# Patient Record
Sex: MALE
[De-identification: names, ages, dates, MRNs, and addresses within clinical notes are randomized per-mention and may not be internally consistent; named-entity substitution may affect disease eponyms.]

---

## 2017-02-15 ENCOUNTER — APPOINTMENT (OUTPATIENT)
Dept: HEART AND VASCULAR | Facility: CLINIC | Age: 47
End: 2017-02-15

## 2017-02-15 VITALS
TEMPERATURE: 97.4 F | BODY MASS INDEX: 26.83 KG/M2 | HEART RATE: 74 BPM | DIASTOLIC BLOOD PRESSURE: 60 MMHG | HEIGHT: 68 IN | WEIGHT: 177 LBS | OXYGEN SATURATION: 97 % | SYSTOLIC BLOOD PRESSURE: 110 MMHG

## 2017-02-15 DIAGNOSIS — Z00.00 ENCOUNTER FOR GENERAL ADULT MEDICAL EXAMINATION W/OUT ABNORMAL FINDINGS: ICD-10-CM

## 2017-02-16 LAB
ALBUMIN SERPL ELPH-MCNC: 4.9 G/DL
ALP BLD-CCNC: 43 U/L
ALT SERPL-CCNC: 45 U/L
ANION GAP SERPL CALC-SCNC: 19 MMOL/L
APPEARANCE: CLEAR
AST SERPL-CCNC: 29 U/L
BACTERIA: NEGATIVE
BASOPHILS # BLD AUTO: 0.04 K/UL
BASOPHILS NFR BLD AUTO: 0.7 %
BILIRUB SERPL-MCNC: 0.9 MG/DL
BILIRUBIN URINE: NEGATIVE
BLOOD URINE: NEGATIVE
BUN SERPL-MCNC: 15 MG/DL
CALCIUM SERPL-MCNC: 9.5 MG/DL
CHLORIDE SERPL-SCNC: 100 MMOL/L
CHOLEST SERPL-MCNC: 203 MG/DL
CHOLEST/HDLC SERPL: 3.3 RATIO
CO2 SERPL-SCNC: 22 MMOL/L
COLOR: YELLOW
CREAT SERPL-MCNC: 0.97 MG/DL
EOSINOPHIL # BLD AUTO: 0.21 K/UL
EOSINOPHIL NFR BLD AUTO: 3.9 %
GLUCOSE QUALITATIVE U: NORMAL MG/DL
GLUCOSE SERPL-MCNC: 72 MG/DL
HBA1C MFR BLD HPLC: 4.8 %
HCT VFR BLD CALC: 45.7 %
HDLC SERPL-MCNC: 62 MG/DL
HGB BLD-MCNC: 15.2 G/DL
IMM GRANULOCYTES NFR BLD AUTO: 0.2 %
KETONES URINE: NEGATIVE
LDLC SERPL CALC-MCNC: 118 MG/DL
LEUKOCYTE ESTERASE URINE: NEGATIVE
LYMPHOCYTES # BLD AUTO: 1.72 K/UL
LYMPHOCYTES NFR BLD AUTO: 31.9 %
MAN DIFF?: NORMAL
MCHC RBC-ENTMCNC: 30.8 PG
MCHC RBC-ENTMCNC: 33.3 GM/DL
MCV RBC AUTO: 92.7 FL
MICROSCOPIC-UA: NORMAL
MONOCYTES # BLD AUTO: 0.33 K/UL
MONOCYTES NFR BLD AUTO: 6.1 %
NEUTROPHILS # BLD AUTO: 3.08 K/UL
NEUTROPHILS NFR BLD AUTO: 57.2 %
NITRITE URINE: NEGATIVE
PH URINE: 7
PLATELET # BLD AUTO: 191 K/UL
POTASSIUM SERPL-SCNC: 3.9 MMOL/L
PROT SERPL-MCNC: 7.3 G/DL
PROTEIN URINE: NEGATIVE MG/DL
RBC # BLD: 4.93 M/UL
RBC # FLD: 13.7 %
RED BLOOD CELLS URINE: 0 /HPF
SODIUM SERPL-SCNC: 141 MMOL/L
SPECIFIC GRAVITY URINE: 1.01
SQUAMOUS EPITHELIAL CELLS: 0 /HPF
TRIGL SERPL-MCNC: 114 MG/DL
TSH SERPL-ACNC: 3.99 UIU/ML
UROBILINOGEN URINE: NORMAL MG/DL
WBC # FLD AUTO: 5.39 K/UL
WHITE BLOOD CELLS URINE: 0 /HPF

## 2021-02-25 ENCOUNTER — OUTPATIENT (OUTPATIENT)
Dept: OUTPATIENT SERVICES | Facility: HOSPITAL | Age: 51
LOS: 1 days | End: 2021-02-25
Payer: COMMERCIAL

## 2021-02-25 DIAGNOSIS — Z20.828 CONTACT WITH AND (SUSPECTED) EXPOSURE TO OTHER VIRAL COMMUNICABLE DISEASES: ICD-10-CM

## 2021-02-25 LAB — SARS-COV-2 RNA SPEC QL NAA+PROBE: DETECTED

## 2021-02-25 PROCEDURE — U0003: CPT

## 2021-02-25 PROCEDURE — U0005: CPT

## 2021-02-25 PROCEDURE — C9803: CPT

## 2021-02-26 DIAGNOSIS — Z20.828 CONTACT WITH AND (SUSPECTED) EXPOSURE TO OTHER VIRAL COMMUNICABLE DISEASES: ICD-10-CM

## 2022-12-23 ENCOUNTER — OFFICE (OUTPATIENT)
Dept: URBAN - METROPOLITAN AREA CLINIC 102 | Facility: CLINIC | Age: 52
Setting detail: OPHTHALMOLOGY
End: 2022-12-23
Payer: COMMERCIAL

## 2022-12-23 DIAGNOSIS — H40.023: ICD-10-CM

## 2022-12-23 PROCEDURE — 92083 EXTENDED VISUAL FIELD XM: CPT | Performed by: OPHTHALMOLOGY

## 2022-12-23 PROCEDURE — 92004 COMPRE OPH EXAM NEW PT 1/>: CPT | Performed by: OPHTHALMOLOGY

## 2022-12-23 PROCEDURE — 92133 CPTRZD OPH DX IMG PST SGM ON: CPT | Performed by: OPHTHALMOLOGY

## 2022-12-23 PROCEDURE — 76514 ECHO EXAM OF EYE THICKNESS: CPT | Performed by: OPHTHALMOLOGY

## 2022-12-23 PROCEDURE — 92020 GONIOSCOPY: CPT | Performed by: OPHTHALMOLOGY

## 2022-12-23 ASSESSMENT — REFRACTION_AUTOREFRACTION
OS_AXIS: 092
OD_SPHERE: -0.25
OD_AXIS: 092
OS_CYLINDER: -0.75
OS_SPHERE: +0.50
OD_CYLINDER: -0.25

## 2022-12-23 ASSESSMENT — KERATOMETRY
OS_AXISANGLE_DEGREES: 010
OD_AXISANGLE_DEGREES: 158
OD_K1POWER_DIOPTERS: 43.25
OS_K2POWER_DIOPTERS: 44.50
OD_K2POWER_DIOPTERS: 44.75
OS_K1POWER_DIOPTERS: 43.75

## 2022-12-23 ASSESSMENT — SPHEQUIV_DERIVED
OD_SPHEQUIV: -0.375
OS_SPHEQUIV: 0.125

## 2022-12-23 ASSESSMENT — VISUAL ACUITY
OS_BCVA: 20/20-
OD_BCVA: 20/20

## 2022-12-23 ASSESSMENT — CONFRONTATIONAL VISUAL FIELD TEST (CVF)
OD_FINDINGS: FULL
OS_FINDINGS: FULL

## 2022-12-23 ASSESSMENT — PACHYMETRY
OS_CT_UM: 559
OD_CT_CORRECTION: -2
OS_CT_CORRECTION: -1
OD_CT_UM: 574

## 2022-12-23 ASSESSMENT — AXIALLENGTH_DERIVED
OS_AL: 23.3169
OD_AL: 23.5544

## 2023-01-06 ENCOUNTER — OFFICE (OUTPATIENT)
Dept: URBAN - METROPOLITAN AREA CLINIC 102 | Facility: CLINIC | Age: 53
Setting detail: OPHTHALMOLOGY
End: 2023-01-06
Payer: COMMERCIAL

## 2023-01-06 DIAGNOSIS — H40.023: ICD-10-CM

## 2023-01-06 PROCEDURE — 92250 FUNDUS PHOTOGRAPHY W/I&R: CPT | Performed by: OPHTHALMOLOGY

## 2023-01-06 PROCEDURE — 99213 OFFICE O/P EST LOW 20 MIN: CPT | Performed by: OPHTHALMOLOGY

## 2023-01-06 ASSESSMENT — PACHYMETRY
OS_CT_CORRECTION: -1
OD_CT_UM: 574
OD_CT_CORRECTION: -2
OS_CT_UM: 559

## 2023-01-06 ASSESSMENT — CONFRONTATIONAL VISUAL FIELD TEST (CVF)
OD_FINDINGS: FULL
OS_FINDINGS: FULL

## 2023-01-06 ASSESSMENT — REFRACTION_AUTOREFRACTION
OS_SPHERE: +0.50
OS_CYLINDER: -0.75
OD_CYLINDER: -0.25
OD_AXIS: 092
OS_AXIS: 092
OD_SPHERE: -0.25

## 2023-01-06 ASSESSMENT — KERATOMETRY
OD_K2POWER_DIOPTERS: 44.75
OS_AXISANGLE_DEGREES: 010
OS_K1POWER_DIOPTERS: 43.75
OD_AXISANGLE_DEGREES: 158
OS_K2POWER_DIOPTERS: 44.50
OD_K1POWER_DIOPTERS: 43.25

## 2023-01-06 ASSESSMENT — AXIALLENGTH_DERIVED
OD_AL: 23.5544
OS_AL: 23.3169

## 2023-01-06 ASSESSMENT — SPHEQUIV_DERIVED
OD_SPHEQUIV: -0.375
OS_SPHEQUIV: 0.125

## 2023-01-06 ASSESSMENT — VISUAL ACUITY
OD_BCVA: 20/20
OS_BCVA: 20/20

## 2023-01-06 ASSESSMENT — TONOMETRY: OS_IOP_MMHG: 19

## 2023-02-09 ENCOUNTER — RX ONLY (RX ONLY)
Age: 53
End: 2023-02-09

## 2023-02-09 ENCOUNTER — OFFICE (OUTPATIENT)
Dept: URBAN - METROPOLITAN AREA CLINIC 102 | Facility: CLINIC | Age: 53
Setting detail: OPHTHALMOLOGY
End: 2023-02-09
Payer: COMMERCIAL

## 2023-02-09 DIAGNOSIS — H40.023: ICD-10-CM

## 2023-02-09 DIAGNOSIS — H52.13: ICD-10-CM

## 2023-02-09 PROCEDURE — 99213 OFFICE O/P EST LOW 20 MIN: CPT | Performed by: OPHTHALMOLOGY

## 2023-02-09 PROCEDURE — 92015 DETERMINE REFRACTIVE STATE: CPT | Performed by: OPHTHALMOLOGY

## 2023-02-09 ASSESSMENT — KERATOMETRY
OS_K1POWER_DIOPTERS: 43.75
OS_K2POWER_DIOPTERS: 44.50
OS_AXISANGLE_DEGREES: 60
OD_AXISANGLE_DEGREES: 130
OD_K1POWER_DIOPTERS: 43.75
OD_K2POWER_DIOPTERS: 44.50

## 2023-02-09 ASSESSMENT — CONFRONTATIONAL VISUAL FIELD TEST (CVF)
OS_FINDINGS: FULL
OD_FINDINGS: FULL

## 2023-02-09 ASSESSMENT — REFRACTION_AUTOREFRACTION
OD_SPHERE: +0.75
OD_CYLINDER: -1.25
OS_SPHERE: +0.75
OD_AXIS: 97
OS_CYLINDER: -1.00
OS_AXIS: 85

## 2023-02-09 ASSESSMENT — PACHYMETRY
OD_CT_CORRECTION: -2
OD_CT_UM: 574
OS_CT_UM: 559
OS_CT_CORRECTION: -1

## 2023-02-09 ASSESSMENT — AXIALLENGTH_DERIVED
OS_AL: 23.2694
OD_AL: 23.3169

## 2023-02-09 ASSESSMENT — TONOMETRY
OD_IOP_MMHG: 15
OS_IOP_MMHG: 16
OS_IOP_MMHG: 15
OD_IOP_MMHG: 17

## 2023-02-09 ASSESSMENT — VISUAL ACUITY
OD_BCVA: 20/20-1
OS_BCVA: 20/20-1

## 2023-02-09 ASSESSMENT — SPHEQUIV_DERIVED
OD_SPHEQUIV: 0.125
OS_SPHEQUIV: 0.25

## 2023-05-19 ENCOUNTER — OFFICE (OUTPATIENT)
Dept: URBAN - METROPOLITAN AREA CLINIC 102 | Facility: CLINIC | Age: 53
Setting detail: OPHTHALMOLOGY
End: 2023-05-19
Payer: COMMERCIAL

## 2023-05-19 DIAGNOSIS — H40.023: ICD-10-CM

## 2023-05-19 PROCEDURE — 92014 COMPRE OPH EXAM EST PT 1/>: CPT | Performed by: OPHTHALMOLOGY

## 2023-05-19 ASSESSMENT — TONOMETRY
OS_IOP_MMHG: 16
OD_IOP_MMHG: 16
OS_IOP_MMHG: 12
OD_IOP_MMHG: 20

## 2023-05-19 ASSESSMENT — KERATOMETRY
OS_K2POWER_DIOPTERS: 44.75
OD_K1POWER_DIOPTERS: 43.75
OD_AXISANGLE_DEGREES: 158
OS_AXISANGLE_DEGREES: 005
OD_K2POWER_DIOPTERS: 44.50
METHOD_AUTO_MANUAL: AUTO
OS_K1POWER_DIOPTERS: 44.00

## 2023-05-19 ASSESSMENT — REFRACTION_AUTOREFRACTION
OD_CYLINDER: -1.00
OS_CYLINDER: -1.00
OD_SPHERE: +0.50
OS_AXIS: 091
OS_SPHERE: +0.50
OD_AXIS: 092

## 2023-05-19 ASSESSMENT — AXIALLENGTH_DERIVED
OD_AL: 23.3645
OS_AL: 23.2748

## 2023-05-19 ASSESSMENT — PACHYMETRY
OD_CT_CORRECTION: -2
OS_CT_UM: 559
OS_CT_CORRECTION: -1
OD_CT_UM: 574

## 2023-05-19 ASSESSMENT — SPHEQUIV_DERIVED
OD_SPHEQUIV: 0
OS_SPHEQUIV: 0

## 2023-05-19 ASSESSMENT — CONFRONTATIONAL VISUAL FIELD TEST (CVF)
OS_FINDINGS: FULL
OD_FINDINGS: FULL

## 2023-05-19 ASSESSMENT — VISUAL ACUITY
OD_BCVA: 20/20-
OS_BCVA: 20/20-1

## 2023-12-02 ENCOUNTER — OFFICE (OUTPATIENT)
Dept: URBAN - METROPOLITAN AREA CLINIC 102 | Facility: CLINIC | Age: 53
Setting detail: OPHTHALMOLOGY
End: 2023-12-02
Payer: COMMERCIAL

## 2023-12-02 DIAGNOSIS — H40.023: ICD-10-CM

## 2023-12-02 PROCEDURE — 92083 EXTENDED VISUAL FIELD XM: CPT | Performed by: OPHTHALMOLOGY

## 2023-12-02 PROCEDURE — 92133 CPTRZD OPH DX IMG PST SGM ON: CPT | Performed by: OPHTHALMOLOGY

## 2023-12-02 PROCEDURE — 92014 COMPRE OPH EXAM EST PT 1/>: CPT | Performed by: OPHTHALMOLOGY

## 2023-12-02 PROCEDURE — 92020 GONIOSCOPY: CPT | Performed by: OPHTHALMOLOGY

## 2023-12-02 ASSESSMENT — CONFRONTATIONAL VISUAL FIELD TEST (CVF)
OS_FINDINGS: FULL
OD_FINDINGS: FULL

## 2023-12-02 ASSESSMENT — REFRACTION_AUTOREFRACTION
OD_SPHERE: +0.50
OD_AXIS: 094
OS_CYLINDER: -1.00
OD_CYLINDER: -1.00
OS_AXIS: 091
OS_SPHERE: +0.50

## 2023-12-02 ASSESSMENT — SPHEQUIV_DERIVED
OD_SPHEQUIV: 0
OS_SPHEQUIV: 0

## 2024-06-07 ENCOUNTER — OFFICE (OUTPATIENT)
Dept: URBAN - METROPOLITAN AREA CLINIC 102 | Facility: CLINIC | Age: 54
Setting detail: OPHTHALMOLOGY
End: 2024-06-07
Payer: COMMERCIAL

## 2024-06-07 DIAGNOSIS — H40.023: ICD-10-CM

## 2024-06-07 DIAGNOSIS — D31.30: ICD-10-CM

## 2024-06-07 PROBLEM — H52.7 REFRACTIVE ERROR: Status: ACTIVE | Noted: 2024-06-07

## 2024-06-07 PROCEDURE — 92014 COMPRE OPH EXAM EST PT 1/>: CPT | Performed by: OPHTHALMOLOGY

## 2024-06-07 ASSESSMENT — CONFRONTATIONAL VISUAL FIELD TEST (CVF)
OD_FINDINGS: FULL
OS_FINDINGS: FULL

## 2024-12-17 ENCOUNTER — INPATIENT (INPATIENT)
Facility: HOSPITAL | Age: 54
LOS: 5 days | Discharge: HOME | DRG: 448 | End: 2024-12-23
Attending: ORTHOPAEDIC SURGERY | Admitting: ORTHOPAEDIC SURGERY
Payer: COMMERCIAL

## 2024-12-17 VITALS
OXYGEN SATURATION: 100 % | HEART RATE: 130 BPM | RESPIRATION RATE: 24 BRPM | SYSTOLIC BLOOD PRESSURE: 152 MMHG | DIASTOLIC BLOOD PRESSURE: 131 MMHG

## 2024-12-17 DIAGNOSIS — S32.001A STABLE BURST FRACTURE OF UNSPECIFIED LUMBAR VERTEBRA, INITIAL ENCOUNTER FOR CLOSED FRACTURE: ICD-10-CM

## 2024-12-17 LAB
ABO RH CONFIRMATION: SIGNIFICANT CHANGE UP
ALBUMIN SERPL ELPH-MCNC: 4.1 G/DL — SIGNIFICANT CHANGE UP (ref 3.3–5)
ALP SERPL-CCNC: 45 U/L — SIGNIFICANT CHANGE UP (ref 40–120)
ALT FLD-CCNC: 46 U/L — SIGNIFICANT CHANGE UP (ref 12–78)
ANION GAP SERPL CALC-SCNC: 3 MMOL/L — LOW (ref 5–17)
APTT BLD: 23.9 SEC — LOW (ref 24.5–35.6)
AST SERPL-CCNC: 30 U/L — SIGNIFICANT CHANGE UP (ref 15–37)
BASOPHILS # BLD AUTO: 0.05 K/UL — SIGNIFICANT CHANGE UP (ref 0–0.2)
BASOPHILS NFR BLD AUTO: 0.8 % — SIGNIFICANT CHANGE UP (ref 0–2)
BILIRUB SERPL-MCNC: 0.6 MG/DL — SIGNIFICANT CHANGE UP (ref 0.2–1.2)
BLD GP AB SCN SERPL QL: SIGNIFICANT CHANGE UP
BUN SERPL-MCNC: 17 MG/DL — SIGNIFICANT CHANGE UP (ref 7–23)
CALCIUM SERPL-MCNC: 9.5 MG/DL — SIGNIFICANT CHANGE UP (ref 8.5–10.1)
CHLORIDE SERPL-SCNC: 106 MMOL/L — SIGNIFICANT CHANGE UP (ref 96–108)
CO2 SERPL-SCNC: 29 MMOL/L — SIGNIFICANT CHANGE UP (ref 22–31)
CREAT SERPL-MCNC: 1.25 MG/DL — SIGNIFICANT CHANGE UP (ref 0.5–1.3)
EGFR: 69 ML/MIN/1.73M2 — SIGNIFICANT CHANGE UP
EOSINOPHIL # BLD AUTO: 0.2 K/UL — SIGNIFICANT CHANGE UP (ref 0–0.5)
EOSINOPHIL NFR BLD AUTO: 3.3 % — SIGNIFICANT CHANGE UP (ref 0–6)
ETHANOL SERPL-MCNC: <10 MG/DL — SIGNIFICANT CHANGE UP (ref 0–10)
GLUCOSE SERPL-MCNC: 140 MG/DL — HIGH (ref 70–99)
HCT VFR BLD CALC: 40.8 % — SIGNIFICANT CHANGE UP (ref 39–50)
HGB BLD-MCNC: 14.3 G/DL — SIGNIFICANT CHANGE UP (ref 13–17)
IMM GRANULOCYTES NFR BLD AUTO: 0.5 % — SIGNIFICANT CHANGE UP (ref 0–0.9)
INR BLD: 0.96 RATIO — SIGNIFICANT CHANGE UP (ref 0.85–1.16)
LACTATE SERPL-SCNC: 1.8 MMOL/L — SIGNIFICANT CHANGE UP (ref 0.7–2)
LIDOCAIN IGE QN: 47 U/L — SIGNIFICANT CHANGE UP (ref 13–75)
LYMPHOCYTES # BLD AUTO: 1.04 K/UL — SIGNIFICANT CHANGE UP (ref 1–3.3)
LYMPHOCYTES # BLD AUTO: 17 % — SIGNIFICANT CHANGE UP (ref 13–44)
MCHC RBC-ENTMCNC: 30.9 PG — SIGNIFICANT CHANGE UP (ref 27–34)
MCHC RBC-ENTMCNC: 35 G/DL — SIGNIFICANT CHANGE UP (ref 32–36)
MCV RBC AUTO: 88.1 FL — SIGNIFICANT CHANGE UP (ref 80–100)
MONOCYTES # BLD AUTO: 0.3 K/UL — SIGNIFICANT CHANGE UP (ref 0–0.9)
MONOCYTES NFR BLD AUTO: 4.9 % — SIGNIFICANT CHANGE UP (ref 2–14)
NEUTROPHILS # BLD AUTO: 4.5 K/UL — SIGNIFICANT CHANGE UP (ref 1.8–7.4)
NEUTROPHILS NFR BLD AUTO: 73.5 % — SIGNIFICANT CHANGE UP (ref 43–77)
PLATELET # BLD AUTO: 194 K/UL — SIGNIFICANT CHANGE UP (ref 150–400)
POTASSIUM SERPL-MCNC: 4.4 MMOL/L — SIGNIFICANT CHANGE UP (ref 3.5–5.3)
POTASSIUM SERPL-SCNC: 4.4 MMOL/L — SIGNIFICANT CHANGE UP (ref 3.5–5.3)
PROT SERPL-MCNC: 7.5 GM/DL — SIGNIFICANT CHANGE UP (ref 6–8.3)
PROTHROM AB SERPL-ACNC: 11.1 SEC — SIGNIFICANT CHANGE UP (ref 9.9–13.4)
RBC # BLD: 4.63 M/UL — SIGNIFICANT CHANGE UP (ref 4.2–5.8)
RBC # FLD: 12.2 % — SIGNIFICANT CHANGE UP (ref 10.3–14.5)
SODIUM SERPL-SCNC: 138 MMOL/L — SIGNIFICANT CHANGE UP (ref 135–145)
TROPONIN I, HIGH SENSITIVITY RESULT: <3 NG/L — SIGNIFICANT CHANGE UP
WBC # BLD: 6.12 K/UL — SIGNIFICANT CHANGE UP (ref 3.8–10.5)
WBC # FLD AUTO: 6.12 K/UL — SIGNIFICANT CHANGE UP (ref 3.8–10.5)

## 2024-12-17 PROCEDURE — 85025 COMPLETE CBC W/AUTO DIFF WBC: CPT

## 2024-12-17 PROCEDURE — 85610 PROTHROMBIN TIME: CPT

## 2024-12-17 PROCEDURE — 97116 GAIT TRAINING THERAPY: CPT | Mod: GP

## 2024-12-17 PROCEDURE — 36415 COLL VENOUS BLD VENIPUNCTURE: CPT

## 2024-12-17 PROCEDURE — 72148 MRI LUMBAR SPINE W/O DYE: CPT | Mod: 26

## 2024-12-17 PROCEDURE — 72146 MRI CHEST SPINE W/O DYE: CPT | Mod: 26

## 2024-12-17 PROCEDURE — 71045 X-RAY EXAM CHEST 1 VIEW: CPT | Mod: 26

## 2024-12-17 PROCEDURE — 71045 X-RAY EXAM CHEST 1 VIEW: CPT

## 2024-12-17 PROCEDURE — 81003 URINALYSIS AUTO W/O SCOPE: CPT

## 2024-12-17 PROCEDURE — 86850 RBC ANTIBODY SCREEN: CPT

## 2024-12-17 PROCEDURE — 80048 BASIC METABOLIC PNL TOTAL CA: CPT

## 2024-12-17 PROCEDURE — 99285 EMERGENCY DEPT VISIT HI MDM: CPT

## 2024-12-17 PROCEDURE — C1713: CPT

## 2024-12-17 PROCEDURE — 72170 X-RAY EXAM OF PELVIS: CPT | Mod: 26

## 2024-12-17 PROCEDURE — 72146 MRI CHEST SPINE W/O DYE: CPT | Mod: MC

## 2024-12-17 PROCEDURE — 93010 ELECTROCARDIOGRAM REPORT: CPT

## 2024-12-17 PROCEDURE — 72100 X-RAY EXAM L-S SPINE 2/3 VWS: CPT

## 2024-12-17 PROCEDURE — 82962 GLUCOSE BLOOD TEST: CPT

## 2024-12-17 PROCEDURE — 70450 CT HEAD/BRAIN W/O DYE: CPT | Mod: 26,MC

## 2024-12-17 PROCEDURE — 72125 CT NECK SPINE W/O DYE: CPT | Mod: 26,MC

## 2024-12-17 PROCEDURE — 74177 CT ABD & PELVIS W/CONTRAST: CPT | Mod: 26,MC

## 2024-12-17 PROCEDURE — 72148 MRI LUMBAR SPINE W/O DYE: CPT | Mod: MC

## 2024-12-17 PROCEDURE — 85027 COMPLETE CBC AUTOMATED: CPT

## 2024-12-17 PROCEDURE — 71260 CT THORAX DX C+: CPT | Mod: 26,MC

## 2024-12-17 PROCEDURE — 76000 FLUOROSCOPY <1 HR PHYS/QHP: CPT

## 2024-12-17 PROCEDURE — C1889: CPT

## 2024-12-17 PROCEDURE — 85730 THROMBOPLASTIN TIME PARTIAL: CPT

## 2024-12-17 PROCEDURE — 86900 BLOOD TYPING SEROLOGIC ABO: CPT

## 2024-12-17 PROCEDURE — 86901 BLOOD TYPING SEROLOGIC RH(D): CPT

## 2024-12-17 PROCEDURE — 97163 PT EVAL HIGH COMPLEX 45 MIN: CPT | Mod: GP

## 2024-12-17 RX ORDER — KETOROLAC TROMETHAMINE 30 MG/ML
30 INJECTION INTRAMUSCULAR; INTRAVENOUS ONCE
Refills: 0 | Status: DISCONTINUED | OUTPATIENT
Start: 2024-12-17 | End: 2024-12-17

## 2024-12-17 RX ORDER — DIAZEPAM 5 MG
2.5 TABLET ORAL ONCE
Refills: 0 | Status: DISCONTINUED | OUTPATIENT
Start: 2024-12-17 | End: 2024-12-17

## 2024-12-17 RX ORDER — OXYCODONE HCL 15 MG
10 TABLET ORAL EVERY 4 HOURS
Refills: 0 | Status: DISCONTINUED | OUTPATIENT
Start: 2024-12-17 | End: 2024-12-19

## 2024-12-17 RX ORDER — TIMOLOL MALEATE 0.5 %
1 DROPS OPHTHALMIC (EYE)
Refills: 0 | Status: DISCONTINUED | OUTPATIENT
Start: 2024-12-17 | End: 2024-12-18

## 2024-12-17 RX ORDER — TIMOLOL MALEATE 0.5 %
1 DROPS OPHTHALMIC (EYE)
Refills: 0 | DISCHARGE

## 2024-12-17 RX ORDER — TIMOLOL MALEATE 0.5 %
0 DROPS OPHTHALMIC (EYE)
Refills: 0 | DISCHARGE

## 2024-12-17 RX ORDER — LATANOPROST 50 UG/ML
0 SOLUTION OPHTHALMIC
Refills: 0 | DISCHARGE

## 2024-12-17 RX ORDER — SODIUM CHLORIDE 9 MG/ML
250 INJECTION, SOLUTION INTRAMUSCULAR; INTRAVENOUS; SUBCUTANEOUS ONCE
Refills: 0 | Status: COMPLETED | OUTPATIENT
Start: 2024-12-17 | End: 2024-12-17

## 2024-12-17 RX ORDER — LATANOPROST 50 UG/ML
1 SOLUTION OPHTHALMIC
Refills: 0 | DISCHARGE

## 2024-12-17 RX ORDER — SODIUM CHLORIDE 9 MG/ML
1000 INJECTION, SOLUTION INTRAVENOUS
Refills: 0 | Status: DISCONTINUED | OUTPATIENT
Start: 2024-12-18 | End: 2024-12-21

## 2024-12-17 RX ORDER — TRAMADOL HYDROCHLORIDE 50 MG/1
50 TABLET ORAL EVERY 4 HOURS
Refills: 0 | Status: DISCONTINUED | OUTPATIENT
Start: 2024-12-17 | End: 2024-12-19

## 2024-12-17 RX ORDER — MORPHINE SULFATE 15 MG
4 TABLET, EXTENDED RELEASE ORAL ONCE
Refills: 0 | Status: DISCONTINUED | OUTPATIENT
Start: 2024-12-17 | End: 2024-12-17

## 2024-12-17 RX ORDER — OXYCODONE HCL 15 MG
5 TABLET ORAL EVERY 4 HOURS
Refills: 0 | Status: DISCONTINUED | OUTPATIENT
Start: 2024-12-17 | End: 2024-12-19

## 2024-12-17 RX ORDER — LATANOPROST 50 UG/ML
1 SOLUTION OPHTHALMIC AT BEDTIME
Refills: 0 | Status: DISCONTINUED | OUTPATIENT
Start: 2024-12-17 | End: 2024-12-23

## 2024-12-17 RX ORDER — ONDANSETRON 4 MG/1
4 TABLET ORAL ONCE
Refills: 0 | Status: COMPLETED | OUTPATIENT
Start: 2024-12-17 | End: 2024-12-17

## 2024-12-17 RX ORDER — ACETAMINOPHEN 80 MG/.8ML
650 SOLUTION/ DROPS ORAL EVERY 6 HOURS
Refills: 0 | Status: DISCONTINUED | OUTPATIENT
Start: 2024-12-17 | End: 2024-12-18

## 2024-12-17 RX ADMIN — Medication 4 MILLIGRAM(S): at 16:24

## 2024-12-17 RX ADMIN — SODIUM CHLORIDE 250 MILLILITER(S): 9 INJECTION, SOLUTION INTRAMUSCULAR; INTRAVENOUS; SUBCUTANEOUS at 16:21

## 2024-12-17 RX ADMIN — Medication 4 MILLIGRAM(S): at 21:34

## 2024-12-17 RX ADMIN — KETOROLAC TROMETHAMINE 30 MILLIGRAM(S): 30 INJECTION INTRAMUSCULAR; INTRAVENOUS at 18:26

## 2024-12-17 RX ADMIN — ONDANSETRON 4 MILLIGRAM(S): 4 TABLET ORAL at 16:22

## 2024-12-17 RX ADMIN — Medication 2.5 MILLIGRAM(S): at 16:24

## 2024-12-17 RX ADMIN — KETOROLAC TROMETHAMINE 30 MILLIGRAM(S): 30 INJECTION INTRAMUSCULAR; INTRAVENOUS at 21:34

## 2024-12-17 NOTE — ED ADULT TRIAGE NOTE - CHIEF COMPLAINT QUOTE
Patient presents to the ER with complaints of falling 6-7 ft off ladder onto buttocks. Patient limping, reports back pain and nausea, diaphoretic at this time, pale. EMS notified, awaiting bed. Denies ac use, LOC, neck or chest pain.

## 2024-12-17 NOTE — ED ADULT NURSE REASSESSMENT NOTE - NS ED NURSE REASSESS COMMENT FT1
Pt received from ED RN Harry. Pt A+Ox4, NAD, VSS. Weight entered in flowsheet. Pt denies current sob, cp, N/V. Pt respirations equal and unlabored. Pt profile completed. Awaiting bed assignment, MRI, and further orders. Fall and safety precautions maintained. Care continues as ordered. All questions answered. Call bell within reach.

## 2024-12-17 NOTE — ED PROVIDER NOTE - PHYSICAL EXAMINATION
Gen:  Well appearing in NAD  Head:  NC/AT  HEENT: pupils perrl,no pharyngeal erythema, uvula midline  Cardiac: S1S2, RRR  Abd: Soft, R flank tenderness with spasm  Resp: No distress, CTA   musculoskeletal:: no deformities, no swelling, strength +5/+5, L2-S1 midline tenderness, no stepoffs, no deformit,y sensations intact and strenth to legs, no  bowel or bladder incontinence  Skin: warm and dry as visualized, no rashes  Neuro: VICTORIA, aao x 4  Psych:alert, cooperative, appropriate mood and affect for situation Gen:  Well appearing in NAD  Head:  NC/AT  HEENT: pupils perrl,no pharyngeal erythema, uvula midline  Cardiac: S1S2, RRR  Abd: Soft, R flank tenderness with spasm  Resp: No distress, CTA   musculoskeletal:: no deformities, no swelling, strength +5/+5, L2-S1 midline tenderness, no stepoffs, no deformit,y sensations intact and strenth to legs, no  bowel or bladder incontinence, +5 dorsi and plantar flexion  Skin: warm and dry as visualized, no rashes  Neuro: VICTORIA, aao x 4  Psych:alert, cooperative, appropriate mood and affect for situation

## 2024-12-17 NOTE — ED PROVIDER NOTE - CLINICAL SUMMARY MEDICAL DECISION MAKING FREE TEXT BOX
Plan for CT abdomen/chest/pelvis, rule out traumatic injury, order basic labs. Plan for CT abdomen/chest/pelvis, rule out traumatic injury, order basic labs.      pt with burst fx L1, ortho spine, and pt requesting nsaid for pain as morphine and valium did nothing will give toradol NEVA Solorio DO

## 2024-12-17 NOTE — PROGRESS NOTE ADULT - SUBJECTIVE AND OBJECTIVE BOX
55 yo w male fell 7' off ladder onto his buttocks  c/o severe TLBP  no leg pain  no previous trauma    no significant PMH      PE  + tender TL spine  nv intact      CT scan   burst fx L1  coronal split type fx

## 2024-12-17 NOTE — ED ADULT NURSE NOTE - OBJECTIVE STATEMENT
55 y/o male with no pertinent past medical history presents s/p falling 6-7 off ladder onto buttocks. Pt reports 10/10 back pain and nausea. Denies LOC, AC use, neck, or chest pain. Pt allergic to wasps. No other complaints at this time. VS WNL, EKG performed at bedside, pt sent for CT Scan, wife at bedside

## 2024-12-17 NOTE — PATIENT PROFILE ADULT - FALL HARM RISK - RISK INTERVENTIONS

## 2024-12-17 NOTE — H&P ADULT - HISTORY OF PRESENT ILLNESS
HPI  54yMale c/o low back pain s/p mechanical fall off 6ft ladder. Denies focal weakness, numbness/tingling, or radicular sxs. Denies fevers/chills, acute changes in bowel/bladder function, or saddle anesthesia. Denies HS/LOC or any other ortho injuries at this time. Community ambulator w/o assistive devices at baseline. Denies AC use. Denies hx of malignancy.    ROS  Negative unless otherwise specified in HPI.    PAST MEDICAL & SURGICAL Hx  PAST MEDICAL & SURGICAL HISTORY:      MEDICATIONS  Home Medications:      ALLERGIES  No Known Drug Allergies  wasp (Anaphylaxis)      FAMILY Hx  FAMILY HISTORY:      SOCIAL Hx  Social History:      VITALS  Vital Signs Last 24 Hrs  T(C): 36.8 (17 Dec 2024 16:57), Max: 36.8 (17 Dec 2024 16:57)  T(F): 98.3 (17 Dec 2024 16:57), Max: 98.3 (17 Dec 2024 16:57)  HR: 56 (17 Dec 2024 16:57) (56 - 130)  BP: 122/87 (17 Dec 2024 16:57) (122/87 - 152/131)  BP(mean): 99 (17 Dec 2024 16:57) (99 - 99)  RR: 22 (17 Dec 2024 16:57) (22 - 24)  SpO2: 100% (17 Dec 2024 16:57) (100% - 100%)    Parameters below as of 17 Dec 2024 16:57  Patient On (Oxygen Delivery Method): room air        PHYSICAL EXAM  Gen: Lying in bed, NAD  Resp: No increased WOB  Spine:  Skin intact  bony TTP Lsp  No bony TTP or step-offs along c-, t-spine or sacrum    Motor:                   C5                C6              C7               C8           T1   R            5/5                5/5            5/5             5/5          5/5  L             5/5               5/5             5/5             5/5          5/5                L2             L3             L4               L5            S1  R         5/5           5/5          5/5             5/5           5/5  L          5/5          5/5           5/5             5/5           5/5    Sensory:            C5         C6         C7      C8       T1        (0=absent, 1=impaired, 2=normal, NT=not testable)  R         2            2           2        2         2  L          2            2           2        2         2               L2          L3         L4      L5       S1         (0=absent, 1=impaired, 2=normal, NT=not testable)  R         2            2            2        2        2  L          2            2           2        2         2    (-) Roper test b/l  (-) Straight leg raise test b/l  (-) Babinski sign b/l  (-) Ankle clonus b/l      LABS                        14.3   6.12  )-----------( 194      ( 17 Dec 2024 16:09 )             40.8     12-17    138  |  106  |  17  ----------------------------<  140[H]  4.4   |  29  |  1.25    Ca    9.5      17 Dec 2024 16:09    TPro  7.5  /  Alb  4.1  /  TBili  0.6  /  DBili  x   /  AST  30  /  ALT  46  /  AlkPhos  45  12-17    PT/INR - ( 17 Dec 2024 16:09 )   PT: 11.1 sec;   INR: 0.96 ratio         PTT - ( 17 Dec 2024 16:09 )  PTT:23.9 sec    IMAGING  CT: L1 burst fx w/ some retropulsion (personal read)    ASSESSMENT & PLAN  54yMale w/ L1 burst fx s/p fall off ladder    -admit to ortho  -plan for OR tomorrow for stabilization  -NPO and hold chemical DVT ppx after midnight  -medical consult for preop optimization  -bedrest  -FU MRI T/Lsp  -pain control  -incentive spirometry  -FU AM labs  -discussed with Dr. Quinn who agrees with plan

## 2024-12-17 NOTE — ED ADULT TRIAGE NOTE - GLASGOW COMA SCALE: BEST MOTOR RESPONSE, MLM
(M6) obeys commands

## 2024-12-17 NOTE — ED ADULT NURSE NOTE - NSFALLHARMRISKINTERV_ED_ALL_ED
Assistance OOB with selected safe patient handling equipment if applicable/Assistance with ambulation/Communicate risk of Fall with Harm to all staff, patient, and family/Monitor gait and stability/Provide visual cue: red socks, yellow wristband, yellow gown, etc/Reinforce activity limits and safety measures with patient and family/Bed in lowest position, wheels locked, appropriate side rails in place/Call bell, personal items and telephone in reach/Instruct patient to call for assistance before getting out of bed/chair/stretcher/Non-slip footwear applied when patient is off stretcher/[place] to call system/Physically safe environment - no spills, clutter or unnecessary equipment/Purposeful Proactive Rounding/Room/bathroom lighting operational, light cord in reach

## 2024-12-17 NOTE — ED PROVIDER NOTE - OBJECTIVE STATEMENT
55 y/o male with no pertinent past medical history presents s/p falling 6-7 off ladder onto buttocks. Pt reports back pain and nausea. Denies LOC, AC use, neck, or chest pain. Pt allergic to wasps. No other complaints at this time.

## 2024-12-18 LAB
APPEARANCE UR: CLEAR — SIGNIFICANT CHANGE UP
APTT BLD: 25.6 SEC — SIGNIFICANT CHANGE UP (ref 24.5–35.6)
BILIRUB UR-MCNC: NEGATIVE — SIGNIFICANT CHANGE UP
BUN SERPL-MCNC: 15 MG/DL — SIGNIFICANT CHANGE UP (ref 7–23)
CALCIUM SERPL-MCNC: 8.7 MG/DL — SIGNIFICANT CHANGE UP (ref 8.5–10.1)
CHLORIDE SERPL-SCNC: 106 MMOL/L — SIGNIFICANT CHANGE UP (ref 96–108)
CO2 SERPL-SCNC: 32 MMOL/L — HIGH (ref 22–31)
COLOR SPEC: YELLOW — SIGNIFICANT CHANGE UP
CREAT SERPL-MCNC: 1.11 MG/DL — SIGNIFICANT CHANGE UP (ref 0.5–1.3)
DIFF PNL FLD: NEGATIVE — SIGNIFICANT CHANGE UP
EGFR: 80 ML/MIN/1.73M2 — SIGNIFICANT CHANGE UP
GLUCOSE SERPL-MCNC: 111 MG/DL — HIGH (ref 70–99)
GLUCOSE UR QL: NEGATIVE MG/DL — SIGNIFICANT CHANGE UP
HCT VFR BLD CALC: 38.5 % — LOW (ref 39–50)
HGB BLD-MCNC: 13.5 G/DL — SIGNIFICANT CHANGE UP (ref 13–17)
INR BLD: 0.99 RATIO — SIGNIFICANT CHANGE UP (ref 0.85–1.16)
KETONES UR-MCNC: NEGATIVE MG/DL — SIGNIFICANT CHANGE UP
LEUKOCYTE ESTERASE UR-ACNC: NEGATIVE — SIGNIFICANT CHANGE UP
MCHC RBC-ENTMCNC: 30.8 PG — SIGNIFICANT CHANGE UP (ref 27–34)
MCHC RBC-ENTMCNC: 35.1 G/DL — SIGNIFICANT CHANGE UP (ref 32–36)
MCV RBC AUTO: 87.9 FL — SIGNIFICANT CHANGE UP (ref 80–100)
NITRITE UR-MCNC: NEGATIVE — SIGNIFICANT CHANGE UP
PH UR: 6.5 — SIGNIFICANT CHANGE UP (ref 5–8)
PLATELET # BLD AUTO: 166 K/UL — SIGNIFICANT CHANGE UP (ref 150–400)
POTASSIUM SERPL-MCNC: 4.1 MMOL/L — SIGNIFICANT CHANGE UP (ref 3.5–5.3)
POTASSIUM SERPL-SCNC: 4.1 MMOL/L — SIGNIFICANT CHANGE UP (ref 3.5–5.3)
PROT UR-MCNC: NEGATIVE MG/DL — SIGNIFICANT CHANGE UP
PROTHROM AB SERPL-ACNC: 11.7 SEC — SIGNIFICANT CHANGE UP (ref 9.9–13.4)
RBC # BLD: 4.38 M/UL — SIGNIFICANT CHANGE UP (ref 4.2–5.8)
RBC # FLD: 11.9 % — SIGNIFICANT CHANGE UP (ref 10.3–14.5)
SODIUM SERPL-SCNC: 138 MMOL/L — SIGNIFICANT CHANGE UP (ref 135–145)
SP GR SPEC: 1.01 — SIGNIFICANT CHANGE UP (ref 1–1.03)
UROBILINOGEN FLD QL: 0.2 MG/DL — SIGNIFICANT CHANGE UP (ref 0.2–1)
WBC # BLD: 6.17 K/UL — SIGNIFICANT CHANGE UP (ref 3.8–10.5)
WBC # FLD AUTO: 6.17 K/UL — SIGNIFICANT CHANGE UP (ref 3.8–10.5)

## 2024-12-18 PROCEDURE — 99221 1ST HOSP IP/OBS SF/LOW 40: CPT

## 2024-12-18 RX ORDER — CHLORHEXIDINE GLUCONATE 1.2 MG/ML
1 RINSE ORAL ONCE
Refills: 0 | Status: COMPLETED | OUTPATIENT
Start: 2024-12-18 | End: 2024-12-18

## 2024-12-18 RX ORDER — ACETAMINOPHEN 80 MG/.8ML
1000 SOLUTION/ DROPS ORAL ONCE
Refills: 0 | Status: COMPLETED | OUTPATIENT
Start: 2024-12-18 | End: 2024-12-18

## 2024-12-18 RX ORDER — TRANEXAMIC ACID 650 MG/1
2000 TABLET ORAL ONCE
Refills: 0 | Status: DISCONTINUED | OUTPATIENT
Start: 2024-12-19 | End: 2024-12-21

## 2024-12-18 RX ORDER — SODIUM CHLORIDE 9 MG/ML
1000 INJECTION, SOLUTION INTRAMUSCULAR; INTRAVENOUS; SUBCUTANEOUS
Refills: 0 | Status: DISCONTINUED | OUTPATIENT
Start: 2024-12-18 | End: 2024-12-18

## 2024-12-18 RX ORDER — TIMOLOL MALEATE 0.5 %
1 DROPS OPHTHALMIC (EYE)
Refills: 0 | Status: DISCONTINUED | OUTPATIENT
Start: 2024-12-18 | End: 2024-12-23

## 2024-12-18 RX ORDER — ACETAMINOPHEN 80 MG/.8ML
1000 SOLUTION/ DROPS ORAL EVERY 8 HOURS
Refills: 0 | Status: DISCONTINUED | OUTPATIENT
Start: 2024-12-18 | End: 2024-12-19

## 2024-12-18 RX ORDER — CYCLOBENZAPRINE HCL 10 MG
5 TABLET ORAL THREE TIMES A DAY
Refills: 0 | Status: DISCONTINUED | OUTPATIENT
Start: 2024-12-18 | End: 2024-12-19

## 2024-12-18 RX ADMIN — ACETAMINOPHEN 400 MILLIGRAM(S): 80 SOLUTION/ DROPS ORAL at 08:40

## 2024-12-18 RX ADMIN — Medication 5 MILLIGRAM(S): at 08:40

## 2024-12-18 RX ADMIN — Medication 1 DROP(S): at 09:17

## 2024-12-18 RX ADMIN — CHLORHEXIDINE GLUCONATE 1 APPLICATION(S): 1.2 RINSE ORAL at 21:37

## 2024-12-18 RX ADMIN — Medication 5 MILLIGRAM(S): at 21:36

## 2024-12-18 RX ADMIN — LATANOPROST 1 DROP(S): 50 SOLUTION OPHTHALMIC at 21:36

## 2024-12-18 RX ADMIN — Medication 5 MILLIGRAM(S): at 15:06

## 2024-12-18 RX ADMIN — ACETAMINOPHEN 400 MILLIGRAM(S): 80 SOLUTION/ DROPS ORAL at 15:06

## 2024-12-18 RX ADMIN — Medication 1 DROP(S): at 00:29

## 2024-12-18 RX ADMIN — ACETAMINOPHEN 1000 MILLIGRAM(S): 80 SOLUTION/ DROPS ORAL at 08:50

## 2024-12-18 RX ADMIN — LATANOPROST 1 DROP(S): 50 SOLUTION OPHTHALMIC at 00:28

## 2024-12-18 RX ADMIN — Medication 1 DROP(S): at 18:14

## 2024-12-18 RX ADMIN — ACETAMINOPHEN 1000 MILLIGRAM(S): 80 SOLUTION/ DROPS ORAL at 21:36

## 2024-12-18 RX ADMIN — ACETAMINOPHEN 1000 MILLIGRAM(S): 80 SOLUTION/ DROPS ORAL at 22:06

## 2024-12-18 RX ADMIN — SODIUM CHLORIDE 100 MILLILITER(S): 9 INJECTION, SOLUTION INTRAVENOUS at 00:31

## 2024-12-18 RX ADMIN — ACETAMINOPHEN 1000 MILLIGRAM(S): 80 SOLUTION/ DROPS ORAL at 15:21

## 2024-12-18 NOTE — PROGRESS NOTE ADULT - NUTRITIONAL ASSESSMENT
ASSESSMENT & PLAN  54yMale w/ L1 burst fx s/p fall off ladder    -admitted to Ortho  -plan for OR tomorrow for posterior stabilization  -NPO after midnight  -medical consult for preop optimization  -bedrest  -pain control  -incentive spirometry  -consent signed  -procedures and risks of surgery discussed with patient

## 2024-12-18 NOTE — PROGRESS NOTE ADULT - SUBJECTIVE AND OBJECTIVE BOX
HPI:  HPI  54yMale c/o low back pain s/p mechanical fall off 6ft ladder. Denies focal weakness, numbness/tingling, or radicular sxs. Denies fevers/chills, acute changes in bowel/bladder function, or saddle anesthesia. Denies HS/LOC or any other ortho injuries at this time. Community ambulator w/o assistive devices at baseline. Denies AC use. Denies hx of malignancy.    Studies revealed comminuted burst fracture L1 with good overall alignment    12/18/24 Patient with persistent thoracolumbar back pain with any activity-denies radicular pain or weakness, change in bowel/bladder function    PAST MEDICAL & SURGICAL Hx  PAST MEDICAL & SURGICAL HISTORY:  Glaucoma    MEDICATIONS  Home Medications:  Home Medications:  latanoprost 0.005% ophthalmic solution: 1 drop(s) in each eye once a day (at bedtime) (17 Dec 2024 21:31)  timolol hemihydrate 0.5% ophthalmic solution: 1 drop(s) in each affected eye 2 times a day (17 Dec 2024 21:31)      MEDICATIONS  (STANDING):  chlorhexidine 4% Liquid 1 Application(s) Topical once  lactated ringers. 1000 milliLiter(s) (100 mL/Hr) IV Continuous <Continuous>  latanoprost 0.005% Ophthalmic Solution 1 Drop(s) Both EYES at bedtime  timolol 0.5% Solution 1 Drop(s) Both EYES two times a day    MEDICATIONS  (PRN):  acetaminophen     Tablet .. 1000 milliGRAM(s) Oral every 8 hours PRN Temp greater or equal to 38C (100.4F), Mild Pain (1 - 3)  cyclobenzaprine 5 milliGRAM(s) Oral three times a day PRN Muscle Spasm  oxyCODONE    IR 10 milliGRAM(s) Oral every 4 hours PRN Severe Pain (7 - 10)  oxyCODONE    IR 5 milliGRAM(s) Oral every 4 hours PRN Moderate Pain (4 - 6)  traMADol 50 milliGRAM(s) Oral every 4 hours PRN Mild Pain (1 - 3)    ALLERGIES  No Known Drug Allergies  wasp (Anaphylaxis)      FAMILY Hx  FAMILY HISTORY:  Negative in primary relatives      SOCIAL Hx  Social History: , lives with family, working FT, no EtOH or tob    ROS  Negative unless otherwise specified in HPI.    PE:    Vital Signs Last 24 Hrs  T(C): 36.9 (18 Dec 2024 08:04), Max: 37.1 (17 Dec 2024 23:15)  T(F): 98.4 (18 Dec 2024 08:04), Max: 98.8 (17 Dec 2024 23:15)  HR: 73 (18 Dec 2024 08:04) (56 - 82)  BP: 115/56 (18 Dec 2024 08:04) (115/56 - 137/96)  BP(mean): 99 (17 Dec 2024 16:57) (99 - 99)  RR: 18 (18 Dec 2024 08:04) (18 - 22)  SpO2: 98% (18 Dec 2024 08:04) (98% - 100%)    Parameters below as of 18 Dec 2024 08:04  Patient On (Oxygen Delivery Method): room air    Patient sitting upright in bed with c/o T-L BP  Pain worsens with change in position  Spine:  Skin intact  bony TTP T-L junction  No bony TTP or step-offs along C-, T-spine or sacrum    Motor:                   C5                C6              C7               C8           T1   R            5/5                5/5            5/5             5/5          5/5  L             5/5               5/5             5/5             5/5          5/5                L2             L3             L4               L5            S1  R         5/5           5/5          5/5             5/5           5/5  L          5/5          5/5           5/5             5/5           5/5    Sensory:            C5         C6         C7      C8       T1        (0=absent, 1=impaired, 2=normal, NT=not testable)  R         2            2           2        2         2  L          2            2           2        2         2               L2          L3         L4      L5       S1         (0=absent, 1=impaired, 2=normal, NT=not testable)  R         2            2            2        2        2  L          2            2           2        2         2    (-) Roper test b/l  (-) Straight leg raise test b/l  (-) Ankle clonus b/l      LABS                        14.3   6.12  )-----------( 194      ( 17 Dec 2024 16:09 )             40.8     12-17    138  |  106  |  17  ----------------------------<  140[H]  4.4   |  29  |  1.25    Ca    9.5      17 Dec 2024 16:09    TPro  7.5  /  Alb  4.1  /  TBili  0.6  /  DBili  x   /  AST  30  /  ALT  46  /  AlkPhos  45  12-17    PT/INR - ( 17 Dec 2024 16:09 )   PT: 11.1 sec;   INR: 0.96 ratio         PTT - ( 17 Dec 2024 16:09 )  PTT:23.9 sec    IMAGING  MRI T & LS spine  Comminuted burst fracture of the L1 vertebral body with loss   of 60% of vertebral body height and minimal bony retropulsion.Suggestion   of tiny posterior epidural hematoma extending from the midthoracic spine   inferiorly to the L1 level.  No edema is seen within the posterior soft   tissues or ligaments. Mild degenerative disc disease throughout with mild   loss of disc height and signal within the nucleus pulposus. Mild bulges   noted at L2-3 through L4-5 which flatten the ventral thecal sac and   narrow the BILATERAL neural foramina. Tiny LEFT paracentral disc   herniation at L4-5 compresses the ventral thecal sac. Tiny RIGHT   subarticular disc herniation with annular tear at L5-S1.    CT C/A/P  IMPRESSION:  Comminuted burst fracture of the L1 vertebra with mild retropulsion of   the posterior table into the spinal canal, causing mild canal stenosis.   No evidence of malalignment. No other acute abnormality detected.

## 2024-12-18 NOTE — CHART NOTE - NSCHARTNOTEFT_GEN_A_CORE
54 year old man with no significant pmh presenting to  after falling off his 6 ft ladder, pt landed on his back. CT showed Comminuted burst fracture of the L1 vertebra with mild retropulsion of the posterior table into the spinal canal, causing mild canal stenosis. Admitted under ORtho spine- hospitalis consulted for medical clearance and comanagement.     12/18- patient was seen and examined.  complaining of back pain. NPO for possible OR. VSS.       Vital Signs Last 24 Hrs  T(C): 36.9 (18 Dec 2024 08:04), Max: 37.1 (17 Dec 2024 23:15)  T(F): 98.4 (18 Dec 2024 08:04), Max: 98.8 (17 Dec 2024 23:15)  HR: 73 (18 Dec 2024 08:04) (56 - 130)  BP: 115/56 (18 Dec 2024 08:04) (115/56 - 152/131)  BP(mean): 99 (17 Dec 2024 16:57) (99 - 99)  RR: 18 (18 Dec 2024 08:04) (18 - 24)  SpO2: 98% (18 Dec 2024 08:04) (98% - 100%)    Parameters below as of 18 Dec 2024 08:04  Patient On (Oxygen Delivery Method): room air    ROS:   All 10 systems reviewed and found to be negative with the exception of what has been described above.     PE:  Constitutional: NAD, laying in bed  HEENT: NC/AT  Back: no tenderness  Respiratory: respirations even and non labored, LCTA  Cardiovascular: S1S2 regular, no murmurs  Abdomen: soft, not tender, not distended, positive BS  Genitourinary: voiding  Musculoskeletal: no muscle tenderness, no joint swelling or tenderness  Extremities: no pedal edema   Neurological: no focal deficits       PLAN    # L1 fracture    - pain management per ortho  - MRI L, T spine  - PT and dvt ppx once cleared by surgery    #Glaucoma   - cw latanprost     Case d/w team on IDR. Will continue to follow.

## 2024-12-19 DIAGNOSIS — S32.008A OTHER FRACTURE OF UNSPECIFIED LUMBAR VERTEBRA, INITIAL ENCOUNTER FOR CLOSED FRACTURE: ICD-10-CM

## 2024-12-19 DIAGNOSIS — S32.009A UNSPECIFIED FRACTURE OF UNSPECIFIED LUMBAR VERTEBRA, INITIAL ENCOUNTER FOR CLOSED FRACTURE: ICD-10-CM

## 2024-12-19 LAB
ANION GAP SERPL CALC-SCNC: 1 MMOL/L — LOW (ref 5–17)
ANION GAP SERPL CALC-SCNC: 3 MMOL/L — LOW (ref 5–17)
APTT BLD: 27.1 SEC — SIGNIFICANT CHANGE UP (ref 24.5–35.6)
BASOPHILS # BLD AUTO: 0.03 K/UL — SIGNIFICANT CHANGE UP (ref 0–0.2)
BASOPHILS NFR BLD AUTO: 0.4 % — SIGNIFICANT CHANGE UP (ref 0–2)
BUN SERPL-MCNC: 12 MG/DL — SIGNIFICANT CHANGE UP (ref 7–23)
BUN SERPL-MCNC: 16 MG/DL — SIGNIFICANT CHANGE UP (ref 7–23)
CALCIUM SERPL-MCNC: 8.1 MG/DL — LOW (ref 8.5–10.1)
CALCIUM SERPL-MCNC: 8.4 MG/DL — LOW (ref 8.5–10.1)
CHLORIDE SERPL-SCNC: 106 MMOL/L — SIGNIFICANT CHANGE UP (ref 96–108)
CHLORIDE SERPL-SCNC: 107 MMOL/L — SIGNIFICANT CHANGE UP (ref 96–108)
CO2 SERPL-SCNC: 26 MMOL/L — SIGNIFICANT CHANGE UP (ref 22–31)
CO2 SERPL-SCNC: 30 MMOL/L — SIGNIFICANT CHANGE UP (ref 22–31)
CREAT SERPL-MCNC: 0.85 MG/DL — SIGNIFICANT CHANGE UP (ref 0.5–1.3)
CREAT SERPL-MCNC: 0.94 MG/DL — SIGNIFICANT CHANGE UP (ref 0.5–1.3)
EGFR: 105 ML/MIN/1.73M2 — SIGNIFICANT CHANGE UP
EGFR: 98 ML/MIN/1.73M2 — SIGNIFICANT CHANGE UP
EOSINOPHIL # BLD AUTO: 0.07 K/UL — SIGNIFICANT CHANGE UP (ref 0–0.5)
EOSINOPHIL NFR BLD AUTO: 1 % — SIGNIFICANT CHANGE UP (ref 0–6)
GLUCOSE BLDC GLUCOMTR-MCNC: 94 MG/DL — SIGNIFICANT CHANGE UP (ref 70–99)
GLUCOSE SERPL-MCNC: 108 MG/DL — HIGH (ref 70–99)
GLUCOSE SERPL-MCNC: 116 MG/DL — HIGH (ref 70–99)
HCT VFR BLD CALC: 34.5 % — LOW (ref 39–50)
HCT VFR BLD CALC: 36.7 % — LOW (ref 39–50)
HGB BLD-MCNC: 12.3 G/DL — LOW (ref 13–17)
HGB BLD-MCNC: 12.6 G/DL — LOW (ref 13–17)
IMM GRANULOCYTES NFR BLD AUTO: 0.9 % — SIGNIFICANT CHANGE UP (ref 0–0.9)
INR BLD: 0.97 RATIO — SIGNIFICANT CHANGE UP (ref 0.85–1.16)
LYMPHOCYTES # BLD AUTO: 0.59 K/UL — LOW (ref 1–3.3)
LYMPHOCYTES # BLD AUTO: 8.5 % — LOW (ref 13–44)
MCHC RBC-ENTMCNC: 30.4 PG — SIGNIFICANT CHANGE UP (ref 27–34)
MCHC RBC-ENTMCNC: 31.4 PG — SIGNIFICANT CHANGE UP (ref 27–34)
MCHC RBC-ENTMCNC: 34.3 G/DL — SIGNIFICANT CHANGE UP (ref 32–36)
MCHC RBC-ENTMCNC: 35.7 G/DL — SIGNIFICANT CHANGE UP (ref 32–36)
MCV RBC AUTO: 88 FL — SIGNIFICANT CHANGE UP (ref 80–100)
MCV RBC AUTO: 88.4 FL — SIGNIFICANT CHANGE UP (ref 80–100)
MONOCYTES # BLD AUTO: 0.15 K/UL — SIGNIFICANT CHANGE UP (ref 0–0.9)
MONOCYTES NFR BLD AUTO: 2.2 % — SIGNIFICANT CHANGE UP (ref 2–14)
NEUTROPHILS # BLD AUTO: 6.03 K/UL — SIGNIFICANT CHANGE UP (ref 1.8–7.4)
NEUTROPHILS NFR BLD AUTO: 87 % — HIGH (ref 43–77)
PLATELET # BLD AUTO: 164 K/UL — SIGNIFICANT CHANGE UP (ref 150–400)
PLATELET # BLD AUTO: 172 K/UL — SIGNIFICANT CHANGE UP (ref 150–400)
POTASSIUM SERPL-MCNC: 4.2 MMOL/L — SIGNIFICANT CHANGE UP (ref 3.5–5.3)
POTASSIUM SERPL-MCNC: 4.5 MMOL/L — SIGNIFICANT CHANGE UP (ref 3.5–5.3)
POTASSIUM SERPL-SCNC: 4.2 MMOL/L — SIGNIFICANT CHANGE UP (ref 3.5–5.3)
POTASSIUM SERPL-SCNC: 4.5 MMOL/L — SIGNIFICANT CHANGE UP (ref 3.5–5.3)
PROTHROM AB SERPL-ACNC: 11.2 SEC — SIGNIFICANT CHANGE UP (ref 9.9–13.4)
RBC # BLD: 3.92 M/UL — LOW (ref 4.2–5.8)
RBC # BLD: 4.15 M/UL — LOW (ref 4.2–5.8)
RBC # FLD: 12.1 % — SIGNIFICANT CHANGE UP (ref 10.3–14.5)
RBC # FLD: 12.2 % — SIGNIFICANT CHANGE UP (ref 10.3–14.5)
SODIUM SERPL-SCNC: 136 MMOL/L — SIGNIFICANT CHANGE UP (ref 135–145)
SODIUM SERPL-SCNC: 137 MMOL/L — SIGNIFICANT CHANGE UP (ref 135–145)
WBC # BLD: 5.52 K/UL — SIGNIFICANT CHANGE UP (ref 3.8–10.5)
WBC # BLD: 6.93 K/UL — SIGNIFICANT CHANGE UP (ref 3.8–10.5)
WBC # FLD AUTO: 5.52 K/UL — SIGNIFICANT CHANGE UP (ref 3.8–10.5)
WBC # FLD AUTO: 6.93 K/UL — SIGNIFICANT CHANGE UP (ref 3.8–10.5)

## 2024-12-19 PROCEDURE — 99232 SBSQ HOSP IP/OBS MODERATE 35: CPT

## 2024-12-19 PROCEDURE — 72100 X-RAY EXAM L-S SPINE 2/3 VWS: CPT | Mod: 26

## 2024-12-19 RX ORDER — ONDANSETRON 4 MG/1
4 TABLET ORAL EVERY 6 HOURS
Refills: 0 | Status: DISCONTINUED | OUTPATIENT
Start: 2024-12-19 | End: 2024-12-20

## 2024-12-19 RX ORDER — B COMPLEX, C NO.20/FOLIC ACID 1 MG
1 CAPSULE ORAL DAILY
Refills: 0 | Status: DISCONTINUED | OUTPATIENT
Start: 2024-12-19 | End: 2024-12-23

## 2024-12-19 RX ORDER — NALOXONE HCL 0.4 MG/ML
0.1 VIAL (ML) INJECTION
Refills: 0 | Status: DISCONTINUED | OUTPATIENT
Start: 2024-12-19 | End: 2024-12-20

## 2024-12-19 RX ORDER — ONDANSETRON 4 MG/1
4 TABLET ORAL ONCE
Refills: 0 | Status: DISCONTINUED | OUTPATIENT
Start: 2024-12-19 | End: 2024-12-19

## 2024-12-19 RX ORDER — FENTANYL 75 UG/H
50 PATCH, EXTENDED RELEASE TRANSDERMAL
Refills: 0 | Status: DISCONTINUED | OUTPATIENT
Start: 2024-12-19 | End: 2024-12-19

## 2024-12-19 RX ORDER — OXYCODONE HCL 15 MG
5 TABLET ORAL
Refills: 0 | Status: DISCONTINUED | OUTPATIENT
Start: 2024-12-19 | End: 2024-12-20

## 2024-12-19 RX ORDER — HYDROMORPHONE HCL 4 MG
0.5 TABLET ORAL
Refills: 0 | Status: DISCONTINUED | OUTPATIENT
Start: 2024-12-19 | End: 2024-12-19

## 2024-12-19 RX ORDER — OXYCODONE HCL 15 MG
5 TABLET ORAL ONCE
Refills: 0 | Status: DISCONTINUED | OUTPATIENT
Start: 2024-12-19 | End: 2024-12-19

## 2024-12-19 RX ORDER — ACETAMINOPHEN 80 MG/.8ML
650 SOLUTION/ DROPS ORAL EVERY 6 HOURS
Refills: 0 | Status: DISCONTINUED | OUTPATIENT
Start: 2024-12-19 | End: 2024-12-20

## 2024-12-19 RX ORDER — SODIUM CHLORIDE 9 MG/ML
1000 INJECTION, SOLUTION INTRAVENOUS
Refills: 0 | Status: DISCONTINUED | OUTPATIENT
Start: 2024-12-19 | End: 2024-12-21

## 2024-12-19 RX ORDER — BISACODYL 5 MG
5 TABLET, DELAYED RELEASE (ENTERIC COATED) ORAL EVERY 12 HOURS
Refills: 0 | Status: DISCONTINUED | OUTPATIENT
Start: 2024-12-19 | End: 2024-12-23

## 2024-12-19 RX ORDER — CEFAZOLIN SODIUM 1 G
2000 VIAL (EA) INJECTION EVERY 8 HOURS
Refills: 0 | Status: DISCONTINUED | OUTPATIENT
Start: 2024-12-19 | End: 2024-12-19

## 2024-12-19 RX ORDER — CEFAZOLIN SODIUM 1 G
2000 VIAL (EA) INJECTION EVERY 8 HOURS
Refills: 0 | Status: COMPLETED | OUTPATIENT
Start: 2024-12-19 | End: 2024-12-20

## 2024-12-19 RX ORDER — ACETAMINOPHEN 80 MG/.8ML
1000 SOLUTION/ DROPS ORAL ONCE
Refills: 0 | Status: COMPLETED | OUTPATIENT
Start: 2024-12-19 | End: 2024-12-19

## 2024-12-19 RX ORDER — OXYCODONE HCL 15 MG
10 TABLET ORAL
Refills: 0 | Status: DISCONTINUED | OUTPATIENT
Start: 2024-12-19 | End: 2024-12-20

## 2024-12-19 RX ORDER — CYCLOBENZAPRINE HCL 10 MG
10 TABLET ORAL EVERY 8 HOURS
Refills: 0 | Status: DISCONTINUED | OUTPATIENT
Start: 2024-12-19 | End: 2024-12-22

## 2024-12-19 RX ORDER — CELECOXIB 200 MG
200 CAPSULE ORAL DAILY
Refills: 0 | Status: DISCONTINUED | OUTPATIENT
Start: 2024-12-20 | End: 2024-12-23

## 2024-12-19 RX ORDER — HYDROMORPHONE HCL 4 MG
1 TABLET ORAL
Refills: 0 | Status: DISCONTINUED | OUTPATIENT
Start: 2024-12-19 | End: 2024-12-20

## 2024-12-19 RX ORDER — HYDROMORPHONE HCL 4 MG
30 TABLET ORAL
Refills: 0 | Status: DISCONTINUED | OUTPATIENT
Start: 2024-12-19 | End: 2024-12-20

## 2024-12-19 RX ADMIN — ACETAMINOPHEN 1000 MILLIGRAM(S): 80 SOLUTION/ DROPS ORAL at 06:40

## 2024-12-19 RX ADMIN — Medication 2000 MILLIGRAM(S): at 23:11

## 2024-12-19 RX ADMIN — Medication 0.5 MILLIGRAM(S): at 16:15

## 2024-12-19 RX ADMIN — ACETAMINOPHEN 1000 MILLIGRAM(S): 80 SOLUTION/ DROPS ORAL at 23:30

## 2024-12-19 RX ADMIN — Medication 10 MILLIGRAM(S): at 23:11

## 2024-12-19 RX ADMIN — Medication 0.5 MILLIGRAM(S): at 16:45

## 2024-12-19 RX ADMIN — Medication 1 DROP(S): at 10:19

## 2024-12-19 RX ADMIN — ACETAMINOPHEN 400 MILLIGRAM(S): 80 SOLUTION/ DROPS ORAL at 05:56

## 2024-12-19 RX ADMIN — SODIUM CHLORIDE 125 MILLILITER(S): 9 INJECTION, SOLUTION INTRAVENOUS at 16:16

## 2024-12-19 RX ADMIN — Medication 0.5 MILLIGRAM(S): at 16:30

## 2024-12-19 RX ADMIN — Medication 30 MILLILITER(S): at 16:32

## 2024-12-19 RX ADMIN — ACETAMINOPHEN 400 MILLIGRAM(S): 80 SOLUTION/ DROPS ORAL at 23:12

## 2024-12-19 RX ADMIN — Medication 5 MILLIGRAM(S): at 03:23

## 2024-12-19 NOTE — CONSULT NOTE ADULT - SUBJECTIVE AND OBJECTIVE BOX
Patient is a 54y old  Male who presents with a chief complaint of L1 burst fx (19 Dec 2024 14:17)      BRIEF HOSPITAL COURSE: 54 year old male No Significant PMHx admitted 12/17/2024 s/p fall from a ladder 6 feet onto his back.  Found to have L1 Burst Fx.    Events last 24 hours: POD 0 s/p Fusion, spine, thoracolumbar, 4 levels, with posterior column instrumentation.  No Complaints on exam.     PAST MEDICAL & SURGICAL HISTORY:      Review of Systems:  CONSTITUTIONAL: No fever, chills, or fatigue  EYES: No eye pain, visual disturbances, or discharge  ENMT:  No difficulty hearing, tinnitus, vertigo; No sinus or throat pain  NECK: No pain or stiffness  RESPIRATORY: No cough, wheezing, chills or hemoptysis; No shortness of breath  CARDIOVASCULAR: No chest pain, palpitations, dizziness, or leg swelling  GASTROINTESTINAL: No abdominal or epigastric pain. No nausea, vomiting, or hematemesis; No diarrhea or constipation. No melena or hematochezia.  GENITOURINARY: No dysuria, frequency, hematuria, or incontinence  NEUROLOGICAL: No headaches, memory loss, loss of strength, numbness, or tremors  SKIN: No itching, burning, rashes, or lesions   MUSCULOSKELETAL: No joint pain or swelling; No muscle, back, or extremity pain  PSYCHIATRIC: No depression, anxiety, mood swings, or difficulty sleeping      Medications:  ceFAZolin  Injectable. 2000 milliGRAM(s) IV Push every 8 hours  acetaminophen     Tablet .. 650 milliGRAM(s) Oral every 6 hours  cyclobenzaprine 10 milliGRAM(s) Oral every 8 hours  HYDROmorphone  Injectable 1 milliGRAM(s) IV Push every 3 hours PRN  HYDROmorphone PCA (1 mG/mL) 30 milliLiter(s) PCA Continuous PCA Continuous  ondansetron Injectable 4 milliGRAM(s) IV Push every 6 hours PRN  oxyCODONE    IR 10 milliGRAM(s) Oral every 3 hours PRN  oxyCODONE    IR 5 milliGRAM(s) Oral every 3 hours PRN  tranexamic acid IVPB 2000 milliGRAM(s) IV Intermittent once  bisacodyl 5 milliGRAM(s) Oral every 12 hours PRN  lactated ringers. 1000 milliLiter(s) IV Continuous <Continuous>  lactated ringers. 1000 milliLiter(s) IV Continuous <Continuous>  multivitamin 1 Tablet(s) Oral daily  latanoprost 0.005% Ophthalmic Solution 1 Drop(s) Both EYES at bedtime  timolol 0.5% Solution 1 Drop(s) Both EYES two times a day  naloxone Injectable 0.1 milliGRAM(s) IV Push every 3 minutes PRN        ICU Vital Signs Last 24 Hrs  T(C): 36.7 (19 Dec 2024 17:15), Max: 36.8 (19 Dec 2024 04:00)  T(F): 98 (19 Dec 2024 17:15), Max: 98.2 (19 Dec 2024 04:00)  HR: 66 (19 Dec 2024 20:00) (56 - 72)  BP: 136/90 (19 Dec 2024 18:15) (126/78 - 148/86)  ABP: 137/74 (19 Dec 2024 20:00) (119/85 - 156/79)  RR: 12 (19 Dec 2024 20:00) (10 - 18)  SpO2: 100% (19 Dec 2024 20:00) (97% - 100%)    O2 Parameters below as of 19 Dec 2024 20:00  Patient On (Oxygen Delivery Method): nasal cannula  O2 Flow (L/min): 3              I&O's Detail    18 Dec 2024 07:01  -  19 Dec 2024 07:00  --------------------------------------------------------  IN:    IV PiggyBack: 200 mL    Oral Fluid: 480 mL  Total IN: 680 mL    OUT:    Voided (mL): 600 mL  Total OUT: 600 mL    Total NET: 80 mL      19 Dec 2024 07:01  -  19 Dec 2024 21:23  --------------------------------------------------------  IN:    Lactated Ringers: 500 mL    Other (mL): 1800 mL  Total IN: 2300 mL    OUT:    Bulb (mL): 62 mL    Bulb (mL): 70 mL    Indwelling Catheter - Urethral (mL): 225 mL    Other (mL): 170 mL  Total OUT: 527 mL    Total NET: 1773 mL            LABS:                        12.3   6.93  )-----------( 172      ( 19 Dec 2024 16:23 )             34.5     12-19    136  |  107  |  12  ----------------------------<  116[H]  4.5   |  26  |  0.85    Ca    8.1[L]      19 Dec 2024 16:23            CAPILLARY BLOOD GLUCOSE      POCT Blood Glucose.: 94 mg/dL (19 Dec 2024 16:11)    PT/INR - ( 19 Dec 2024 03:54 )   PT: 11.2 sec;   INR: 0.97 ratio         PTT - ( 19 Dec 2024 03:54 )  PTT:27.1 sec  Urinalysis Basic - ( 19 Dec 2024 16:23 )    Color: x / Appearance: x / SG: x / pH: x  Gluc: 116 mg/dL / Ketone: x  / Bili: x / Urobili: x   Blood: x / Protein: x / Nitrite: x   Leuk Esterase: x / RBC: x / WBC x   Sq Epi: x / Non Sq Epi: x / Bacteria: x      CULTURES:      Physical Examination:    General:  Awake.  Alert, oriented, interactive, nonfocal    HEENT: Pupils equal, reactive to light.  Symmetric. No JVD.    PULM: Clear to auscultation bilaterally, no significant sputum production    CVS: Regular rate and rhythm, no murmurs, rubs, or gallops    ABD: Soft, nondistended, nontender, normoactive bowel sounds, no masses    EXT: No edema, nontender Moves feet well BL no sensory loss, NVI     SKIN: Warm and well perfused, no rashes noted.        RADIOLOGY: < from: MR Thoracic Spine No Cont (12.17.24 @ 22:40) >    ACC: [id number] EXAM:  MR SPINE THORACIC   ORDERED BY: MICHELLE HONG     ACC: [id number] EXAM:  MR SPINE LUMBAR   ORDERED BY: MICHELLE HONG     PROCEDURE DATE:  12/17/2024          INTERPRETATION:  MR thoracic and lumbar spine without gadolinium    CLINICAL INDICATION: Trauma, fall off ladder.    TECHNIQUE:   Sagittal  and axial T1-weighted images, sagittal STIR   images,  and sagittal and axial T2-weighted images of the thoracic spine   were obtained.    FINDINGS:   No prior similar studies are available for review.    Thoracic and lumbar vertebral alignment is preserved.  Thoracic vertebral   body heights are maintained. There is a comminuted burst fracture of the   L1 vertebral body with loss of 60% of vertebral body height and minimal   bony retropulsion. Marrow signal intensity within thoracic vertebral   bodies and posterior elements is significant for extensive edema within   the vertebral body extending into the pedicles which appear to be intact.    Suggestion of tiny posterior epidural hematoma extending from the   midthoracic spine inferiorly to the L1 level. No edema is seen within the   posterior soft tissues or ligaments.    Thoracic and lumbar intervertebral discs show mild degenerative disc   disease throughout with mildloss of disc height and signal within the   nucleus pulposus. Mild bulges noted at L2-3 through L4-5 which flatten   the ventral thecal sac and narrow the BILATERAL neural foramina. Tiny   LEFT paracentral disc herniation at L4-5 compresses the ventral thecal   sac. Tiny RIGHT subarticular disc herniation with annular tear at L5-S1.    The thoracic cord maintains intact morphology.  Thoracic cord signal   intensity is intact. The conus medullaris terminates at the L1-2 level      IMPRESSION:  Comminuted burst fracture of the L1 vertebral body with loss   of 60% of vertebral body height and minimal bony retropulsion.Suggestion   of tiny posterior epidural hematoma extending from the midthoracic spine   inferiorly to the L1 level.  No edema is seen within the posterior soft   tissues or ligaments. Mild degenerative disc disease throughout with mild   loss of disc height and signal within the nucleus pulposus. Mild bulges   noted at L2-3 through L4-5 which flatten the ventral thecal sac and   narrow the BILATERAL neural foramina. Tiny LEFT paracentral disc   herniation at L4-5 compresses the ventral thecal sac. Tiny RIGHT   subarticular disc herniation with annular tear at L5-S1.    < end of copied text >      TIME SPENT:  55 minutes   (Assessing presenting problems of acute illness, which pose high probability of life threatening deterioration or end organ damage/dysfunction, as well as medical decision making including initiating plan of care, reviewing data, reviewing radiologic exams, discussing with multidisciplinary team,  discussing goals of care with patient/family, and writing this note.  Non-inclusive of procedures performed)    Date of entry of this note is equal to the date of services rendered.
  CHIEF COMPLAINT: back pain     HISTORY OF THE PRESENT ILLNESS:   54 year old male with no significant pmh presenting to  after falling off his 6 ft ladder today, pt landed on his back , initially having 8/10 pain, currently having no pain. Denies saddle anesthsia, extremity weaknesss or nunbness tingling, in the ED VSS ,abs wnl. Ct imaging showed an L bust fracture. admitted under surgery, medicine consulted for comanagement and clearance   PAST MEDICAL HISTORY:    PAST SURGICAL HISTORY:    FAMILY HISTORY:   non-contributory to the patient's current presentation    SOCIAL HISTORY:  no smoking, no alcohol, no drugs    REVIEW OF SYSTEMS:   All 10 systems reviewed in detailed and found to be negative with the exception of what has already been described above    MEDICATIONS  (STANDING):  acetaminophen     Tablet .. 650 milliGRAM(s) Oral every 6 hours  lactated ringers. 1000 milliLiter(s) (100 mL/Hr) IV Continuous <Continuous>  latanoprost 0.005% Ophthalmic Solution 1 Drop(s) Both EYES at bedtime  timolol 0.5% Solution 1 Drop(s) Both EYES two times a day    MEDICATIONS  (PRN):  oxyCODONE    IR 10 milliGRAM(s) Oral every 4 hours PRN Severe Pain (7 - 10)  oxyCODONE    IR 5 milliGRAM(s) Oral every 4 hours PRN Moderate Pain (4 - 6)  traMADol 50 milliGRAM(s) Oral every 4 hours PRN Mild Pain (1 - 3)      HEENT:  pupils equal and reactive, EOMI, no oropharyngeal lesions, erythema, exudates, oral thrush  NECK:   supple, no carotid bruits  CV:  +S1, +S2, regular, no murmurs  RESP:   lungs clear to auscultation bilaterally, no wheezing, rales, rhonchi, good air entry bilaterally  GI:  abdomen soft, non-tender, non-distended, normal BS  EXT:  no clubbing, no cyanosis, no edema, no calf pain, swelling or erythema  NEURO:  AAOX3, no focal neurological deficits, follows all commands, able to move extremities spontaneously  SKIN:  no ulcers, lesions or rashes    LABS:                          14.3   6.12  )-----------( 194      ( 17 Dec 2024 16:09 )             40.8     12-17    138  |  106  |  17  ----------------------------<  140[H]  4.4   |  29  |  1.25    Ca    9.5      17 Dec 2024 16:09    TPro  7.5  /  Alb  4.1  /  TBili  0.6  /  DBili  x   /  AST  30  /  ALT  46  /  AlkPhos  45  12-17        LIVER FUNCTIONS - ( 17 Dec 2024 16:09 )  Alb: 4.1 g/dL / Pro: 7.5 gm/dL / ALK PHOS: 45 U/L / ALT: 46 U/L / AST: 30 U/L / GGT: x           PT/INR - ( 17 Dec 2024 16:09 )   PT: 11.1 sec;   INR: 0.96 ratio         PTT - ( 17 Dec 2024 16:09 )  PTT:23.9 sec    Urinalysis Basic - ( 17 Dec 2024 16:09 )    Color: x / Appearance: x / SG: x / pH: x  Gluc: 140 mg/dL / Ketone: x  / Bili: x / Urobili: x   Blood: x / Protein: x / Nitrite: x   Leuk Esterase: x / RBC: x / WBC x   Sq Epi: x / Non Sq Epi: x / Bacteria: x        Lactate, Blood: 1.8 mmol/L (12-17 @ 16:09)      Troponin Trend: Lactate, Blood: 1.8 mmol/L (12-17 @ 16:09)                          EKG:     RADIOLOGY STUDIES:      IMPRESSION:        IMPRESSION:  Comminuted burst fracture of the L1 vertebra with mild retropulsion of   the posterior table into the spinal canal, causing mild canal stenosis.   No evidence of malalignment. No other acute abnormality detected

## 2024-12-19 NOTE — PROGRESS NOTE ADULT - SUBJECTIVE AND OBJECTIVE BOX
54 year old man with no significant pmh presenting to  after falling off his 6 ft ladder, pt landed on his back. CT showed Comminuted burst fracture of the L1 vertebra with mild retropulsion of the posterior table into the spinal canal, causing mild canal stenosis. Admitted under ORtho spine- hospitalis consulted for medical clearance and comanagement.     12/19- patient was seen and examined. VSS- no acute overnight events. Denies CP or SOB     Vital Signs Last 24 Hrs  T(C): 36.5 (19 Dec 2024 07:39), Max: 36.9 (18 Dec 2024 16:06)  T(F): 97.7 (19 Dec 2024 07:39), Max: 98.4 (18 Dec 2024 16:06)  HR: 64 (19 Dec 2024 07:39) (56 - 70)  BP: 141/83 (19 Dec 2024 07:39) (114/69 - 141/83)  BP(mean): 83 (18 Dec 2024 16:06) (83 - 83)  RR: 18 (19 Dec 2024 07:39) (18 - 18)  SpO2: 97% (19 Dec 2024 07:39) (97% - 98%)    Parameters below as of 19 Dec 2024 07:39  Patient On (Oxygen Delivery Method): room air        ROS:   All 10 systems reviewed and found to be negative with the exception of what has been described above.     PE:  Constitutional: NAD, laying in bed  HEENT: NC/AT  Back: no tenderness  Respiratory: respirations even and non labored, LCTA  Cardiovascular: S1S2 regular, no murmurs  Abdomen: soft, not tender, not distended, positive BS  Genitourinary: voiding  Musculoskeletal: no muscle tenderness, no joint swelling or tenderness  Extremities: no pedal edema   Neurological: no focal deficits     MEDICATIONS  (STANDING):  HYDROmorphone PCA (1 mG/mL) 30 milliLiter(s) PCA Continuous PCA Continuous  lactated ringers. 1000 milliLiter(s) (100 mL/Hr) IV Continuous <Continuous>  latanoprost 0.005% Ophthalmic Solution 1 Drop(s) Both EYES at bedtime  timolol 0.5% Solution 1 Drop(s) Both EYES two times a day  tranexamic acid IVPB 2000 milliGRAM(s) IV Intermittent once    MEDICATIONS  (PRN):  acetaminophen     Tablet .. 1000 milliGRAM(s) Oral every 8 hours PRN Temp greater or equal to 38C (100.4F), Mild Pain (1 - 3)  cyclobenzaprine 5 milliGRAM(s) Oral three times a day PRN Muscle Spasm  fentaNYL    Injectable 50 MICROGram(s) IV Push every 5 minutes PRN Severe Pain (7 - 10)  HYDROmorphone  Injectable 0.5 milliGRAM(s) IV Push every 10 minutes PRN Moderate Pain (4 - 6)  naloxone Injectable 0.1 milliGRAM(s) IV Push every 3 minutes PRN For ANY of the following changes in patient status:  A. RR LESS THAN 10 breaths per minute, B. Oxygen saturation LESS THAN 90%, C. Sedation score of 6  ondansetron Injectable 4 milliGRAM(s) IV Push every 6 hours PRN Nausea  ondansetron Injectable 4 milliGRAM(s) IV Push once PRN Nausea and/or Vomiting  oxyCODONE    IR 10 milliGRAM(s) Oral every 4 hours PRN Severe Pain (7 - 10)  oxyCODONE    IR 5 milliGRAM(s) Oral every 4 hours PRN Moderate Pain (4 - 6)  oxyCODONE    IR 5 milliGRAM(s) Oral once PRN Mild Pain (1 - 3)  traMADol 50 milliGRAM(s) Oral every 4 hours PRN Mild Pain (1 - 3)

## 2024-12-19 NOTE — PROGRESS NOTE ADULT - ASSESSMENT
PLAN    # L1 fracture s/p fall   - management per ortho  - MRI L, T spine- results reviewed   - PT and dvt ppx once cleared by surgery    #Glaucoma   - cw latanprost     Case d/w team on IDR. Will continue to follow.

## 2024-12-19 NOTE — CONSULT NOTE ADULT - ASSESSMENT
54 year old male with no significant pmh presenting to  after falling off his 6 ft ladder today, pt landed on his back , initially having 8/10 pain, currently having no pain. Denies saddle anesthsia, extremity weaknesss or nunbness tingling, in the ED VSS ,abs wnl. Ct imaging showed an L bust fracture. admitted under surgery, medicine consulted for comanagement and clearance.      Assessment/Plan:    # L1 fracture    - pain management per ortho  - MRI L, T spine  - PT and dvt ppx once cleared by surgery  - RCRI 0, cleared for surgery    #Glaucoma     - cw latanprost     Code status: Full  Diet: NPO   DVT ppx pending ortho clearance   Activity: Bedrest  Disposition: Admission for L1 fracture   
54 year old male No Significant PMHx admitted 12/17/2024 s/p fall from a ladder 6 feet onto his back.          Spinal Fx / L1 Burst Fx        POD 0 s/p Fusion, spine, thoracolumbar, 4 levels, with posterior column instrumentation      Admit to SICU level of care  [place]aim Mgt with PCA / Tylenol / Oxy  Unlabored on Room air  HDS thus far   Diet as tolerates  Bowel regime   Watch UOP with vidal cath   Watch Lites   DVT PPx w/ SCD's   AC when Ok with Sx

## 2024-12-19 NOTE — BRIEF OPERATIVE NOTE - NSICDXBRIEFPROCEDURE_GEN_ALL_CORE_FT
PROCEDURES:  Fusion, spine, thoracolumbar, 4 levels, with posterior column instrumentation, with O-arm imaging guidance 19-Dec-2024 17:21:06  Tan Quinn

## 2024-12-19 NOTE — SBIRT NOTE ADULT - NSSBIRTBRIEFINTDET_GEN_A_CORE
Pt endorses having 1-2 glasses of wine nightly. Sw discussed healthy ETOH guidelines and discussed available resources. Pt denies any concerns and receptive to substance misuse resource folder.

## 2024-12-19 NOTE — BRIEF OPERATIVE NOTE - NSICDXBRIEFPREOP_GEN_ALL_CORE_FT
PRE-OP DIAGNOSIS:  Closed three column fracture of lumbar vertebra 19-Dec-2024 17:22:05  Tan Quinn

## 2024-12-20 LAB
ANION GAP SERPL CALC-SCNC: 4 MMOL/L — LOW (ref 5–17)
BASOPHILS # BLD AUTO: 0.02 K/UL — SIGNIFICANT CHANGE UP (ref 0–0.2)
BASOPHILS NFR BLD AUTO: 0.3 % — SIGNIFICANT CHANGE UP (ref 0–2)
BUN SERPL-MCNC: 11 MG/DL — SIGNIFICANT CHANGE UP (ref 7–23)
CALCIUM SERPL-MCNC: 8.4 MG/DL — LOW (ref 8.5–10.1)
CHLORIDE SERPL-SCNC: 105 MMOL/L — SIGNIFICANT CHANGE UP (ref 96–108)
CO2 SERPL-SCNC: 27 MMOL/L — SIGNIFICANT CHANGE UP (ref 22–31)
CREAT SERPL-MCNC: 0.79 MG/DL — SIGNIFICANT CHANGE UP (ref 0.5–1.3)
EGFR: 107 ML/MIN/1.73M2 — SIGNIFICANT CHANGE UP
EOSINOPHIL # BLD AUTO: 0.03 K/UL — SIGNIFICANT CHANGE UP (ref 0–0.5)
EOSINOPHIL NFR BLD AUTO: 0.4 % — SIGNIFICANT CHANGE UP (ref 0–6)
GLUCOSE SERPL-MCNC: 111 MG/DL — HIGH (ref 70–99)
HCT VFR BLD CALC: 29.6 % — LOW (ref 39–50)
HGB BLD-MCNC: 10.6 G/DL — LOW (ref 13–17)
IMM GRANULOCYTES NFR BLD AUTO: 0.4 % — SIGNIFICANT CHANGE UP (ref 0–0.9)
LYMPHOCYTES # BLD AUTO: 0.94 K/UL — LOW (ref 1–3.3)
LYMPHOCYTES # BLD AUTO: 12.1 % — LOW (ref 13–44)
MCHC RBC-ENTMCNC: 30.8 PG — SIGNIFICANT CHANGE UP (ref 27–34)
MCHC RBC-ENTMCNC: 35.8 G/DL — SIGNIFICANT CHANGE UP (ref 32–36)
MCV RBC AUTO: 86 FL — SIGNIFICANT CHANGE UP (ref 80–100)
MONOCYTES # BLD AUTO: 0.51 K/UL — SIGNIFICANT CHANGE UP (ref 0–0.9)
MONOCYTES NFR BLD AUTO: 6.6 % — SIGNIFICANT CHANGE UP (ref 2–14)
NEUTROPHILS # BLD AUTO: 6.24 K/UL — SIGNIFICANT CHANGE UP (ref 1.8–7.4)
NEUTROPHILS NFR BLD AUTO: 80.2 % — HIGH (ref 43–77)
PLATELET # BLD AUTO: 178 K/UL — SIGNIFICANT CHANGE UP (ref 150–400)
POTASSIUM SERPL-MCNC: 4.1 MMOL/L — SIGNIFICANT CHANGE UP (ref 3.5–5.3)
POTASSIUM SERPL-SCNC: 4.1 MMOL/L — SIGNIFICANT CHANGE UP (ref 3.5–5.3)
RBC # BLD: 3.44 M/UL — LOW (ref 4.2–5.8)
RBC # FLD: 11.7 % — SIGNIFICANT CHANGE UP (ref 10.3–14.5)
SODIUM SERPL-SCNC: 136 MMOL/L — SIGNIFICANT CHANGE UP (ref 135–145)
WBC # BLD: 7.77 K/UL — SIGNIFICANT CHANGE UP (ref 3.8–10.5)
WBC # FLD AUTO: 7.77 K/UL — SIGNIFICANT CHANGE UP (ref 3.8–10.5)

## 2024-12-20 RX ORDER — OXYCODONE HCL 15 MG
5 TABLET ORAL EVERY 4 HOURS
Refills: 0 | Status: DISCONTINUED | OUTPATIENT
Start: 2024-12-20 | End: 2024-12-21

## 2024-12-20 RX ORDER — ACETAMINOPHEN 80 MG/.8ML
1000 SOLUTION/ DROPS ORAL ONCE
Refills: 0 | Status: COMPLETED | OUTPATIENT
Start: 2024-12-20 | End: 2024-12-20

## 2024-12-20 RX ORDER — OXYCODONE HCL 15 MG
10 TABLET ORAL EVERY 4 HOURS
Refills: 0 | Status: DISCONTINUED | OUTPATIENT
Start: 2024-12-20 | End: 2024-12-21

## 2024-12-20 RX ADMIN — Medication 1 DROP(S): at 00:03

## 2024-12-20 RX ADMIN — Medication 200 MILLIGRAM(S): at 10:22

## 2024-12-20 RX ADMIN — Medication 1 DROP(S): at 09:52

## 2024-12-20 RX ADMIN — ACETAMINOPHEN 1000 MILLIGRAM(S): 80 SOLUTION/ DROPS ORAL at 18:43

## 2024-12-20 RX ADMIN — Medication 10 MILLIGRAM(S): at 20:29

## 2024-12-20 RX ADMIN — LATANOPROST 1 DROP(S): 50 SOLUTION OPHTHALMIC at 20:30

## 2024-12-20 RX ADMIN — ACETAMINOPHEN 400 MILLIGRAM(S): 80 SOLUTION/ DROPS ORAL at 18:13

## 2024-12-20 RX ADMIN — ACETAMINOPHEN 400 MILLIGRAM(S): 80 SOLUTION/ DROPS ORAL at 12:15

## 2024-12-20 RX ADMIN — ACETAMINOPHEN 1000 MILLIGRAM(S): 80 SOLUTION/ DROPS ORAL at 12:45

## 2024-12-20 RX ADMIN — SODIUM CHLORIDE 125 MILLILITER(S): 9 INJECTION, SOLUTION INTRAVENOUS at 02:49

## 2024-12-20 RX ADMIN — Medication 2000 MILLIGRAM(S): at 07:01

## 2024-12-20 RX ADMIN — Medication 10 MILLIGRAM(S): at 06:38

## 2024-12-20 RX ADMIN — Medication 200 MILLIGRAM(S): at 09:52

## 2024-12-20 RX ADMIN — Medication 10 MILLIGRAM(S): at 14:11

## 2024-12-20 RX ADMIN — LATANOPROST 1 DROP(S): 50 SOLUTION OPHTHALMIC at 00:22

## 2024-12-20 RX ADMIN — Medication 1 TABLET(S): at 09:52

## 2024-12-20 RX ADMIN — Medication 1 DROP(S): at 20:30

## 2024-12-20 NOTE — PROGRESS NOTE ADULT - SUBJECTIVE AND OBJECTIVE BOX
HPI:  HPI  54yMale c/o low back pain s/p mechanical fall off 6ft ladder. Denies focal weakness, numbness/tingling, or radicular sxs. Denies fevers/chills, acute changes in bowel/bladder function, or saddle anesthesia. Denies HS/LOC or any other ortho injuries at this time. Community ambulator w/o assistive devices at baseline. Denies AC use. Denies hx of malignancy.    Studies revealed comminuted burst fracture L1 with good overall alignment    12/18/24 Patient with persistent thoracolumbar back pain with any activity-denies radicular pain or weakness, change in bowel/bladder function  12/19/24 S/P T11-L3 fusion, SSI, LBG, allograft  12/20/24 Patient c/o marked incisional pain    PAST MEDICAL & SURGICAL Hx  PAST MEDICAL & SURGICAL HISTORY:  Glaucoma    MEDICATIONS  Home Medications:  Home Medications:  latanoprost 0.005% ophthalmic solution: 1 drop(s) in each eye once a day (at bedtime) (17 Dec 2024 21:31)  timolol hemihydrate 0.5% ophthalmic solution: 1 drop(s) in each affected eye 2 times a day (17 Dec 2024 21:31)      MEDICATIONS  (STANDING):  chlorhexidine 4% Liquid 1 Application(s) Topical once  lactated ringers. 1000 milliLiter(s) (100 mL/Hr) IV Continuous <Continuous>  latanoprost 0.005% Ophthalmic Solution 1 Drop(s) Both EYES at bedtime  timolol 0.5% Solution 1 Drop(s) Both EYES two times a day    MEDICATIONS  (PRN):  acetaminophen     Tablet .. 1000 milliGRAM(s) Oral every 8 hours PRN Temp greater or equal to 38C (100.4F), Mild Pain (1 - 3)  cyclobenzaprine 5 milliGRAM(s) Oral three times a day PRN Muscle Spasm  oxyCODONE    IR 10 milliGRAM(s) Oral every 4 hours PRN Severe Pain (7 - 10)  oxyCODONE    IR 5 milliGRAM(s) Oral every 4 hours PRN Moderate Pain (4 - 6)  traMADol 50 milliGRAM(s) Oral every 4 hours PRN Mild Pain (1 - 3)    ALLERGIES  No Known Drug Allergies  wasp (Anaphylaxis)      FAMILY Hx  FAMILY HISTORY:  Negative in primary relatives      SOCIAL Hx  Social History: , lives with family, working FT, no EtOH or tob    ROS  Negative unless otherwise specified in HPI.    PE:    Vital Signs Last 24 Hrs  T(C): 36.9 (18 Dec 2024 08:04), Max: 37.1 (17 Dec 2024 23:15)  T(F): 98.4 (18 Dec 2024 08:04), Max: 98.8 (17 Dec 2024 23:15)  HR: 73 (18 Dec 2024 08:04) (56 - 82)  BP: 115/56 (18 Dec 2024 08:04) (115/56 - 137/96)  BP(mean): 99 (17 Dec 2024 16:57) (99 - 99)  RR: 18 (18 Dec 2024 08:04) (18 - 22)  SpO2: 98% (18 Dec 2024 08:04) (98% - 100%)    Parameters below as of 18 Dec 2024 08:04  Patient On (Oxygen Delivery Method): room air    Patient sitting upright in bed with c/o T-L BP  Pain worsens with change in position  Spine:  Skin intact  bony TTP T-L junction  No bony TTP or step-offs along C-, T-spine or sacrum    Motor:                   C5                C6              C7               C8           T1   R            5/5                5/5            5/5             5/5          5/5  L             5/5               5/5             5/5             5/5          5/5                L2             L3             L4               L5            S1  R         5/5           5/5          5/5             5/5           5/5  L          5/5          5/5           5/5             5/5           5/5    Sensory:            C5         C6         C7      C8       T1        (0=absent, 1=impaired, 2=normal, NT=not testable)  R         2            2           2        2         2  L          2            2           2        2         2               L2          L3         L4      L5       S1         (0=absent, 1=impaired, 2=normal, NT=not testable)  R         2            2            2        2        2  L          2            2           2        2         2    (-) Roper test b/l  (-) Straight leg raise test b/l  (-) Ankle clonus b/l      LABS                        14.3   6.12  )-----------( 194      ( 17 Dec 2024 16:09 )             40.8     12-17    138  |  106  |  17  ----------------------------<  140[H]  4.4   |  29  |  1.25    Ca    9.5      17 Dec 2024 16:09    TPro  7.5  /  Alb  4.1  /  TBili  0.6  /  DBili  x   /  AST  30  /  ALT  46  /  AlkPhos  45  12-17    PT/INR - ( 17 Dec 2024 16:09 )   PT: 11.1 sec;   INR: 0.96 ratio         PTT - ( 17 Dec 2024 16:09 )  PTT:23.9 sec    IMAGING  MRI T & LS spine  Comminuted burst fracture of the L1 vertebral body with loss   of 60% of vertebral body height and minimal bony retropulsion.Suggestion   of tiny posterior epidural hematoma extending from the midthoracic spine   inferiorly to the L1 level.  No edema is seen within the posterior soft   tissues or ligaments. Mild degenerative disc disease throughout with mild   loss of disc height and signal within the nucleus pulposus. Mild bulges   noted at L2-3 through L4-5 which flatten the ventral thecal sac and   narrow the BILATERAL neural foramina. Tiny LEFT paracentral disc   herniation at L4-5 compresses the ventral thecal sac. Tiny RIGHT   subarticular disc herniation with annular tear at L5-S1.    CT C/A/P  IMPRESSION:  Comminuted burst fracture of the L1 vertebra with mild retropulsion of   the posterior table into the spinal canal, causing mild canal stenosis.   No evidence of malalignment. No other acute abnormality detected.         HPI:  HPI  54yMale c/o low back pain s/p mechanical fall off 6ft ladder. Denies focal weakness, numbness/tingling, or radicular sxs. Denies fevers/chills, acute changes in bowel/bladder function, or saddle anesthesia. Denies HS/LOC or any other ortho injuries at this time. Community ambulator w/o assistive devices at baseline. Denies AC use. Denies hx of malignancy.    Studies revealed comminuted burst fracture L1 with good overall alignment    12/18/24 Patient with persistent thoracolumbar back pain with any activity-denies radicular pain or weakness, change in bowel/bladder function  12/19/24 S/P T11-L3 fusion, SSI, LBG, allograft  12/20/24 Patient c/o marked incisional pain    PAST MEDICAL & SURGICAL Hx  PAST MEDICAL & SURGICAL HISTORY:  Glaucoma    MEDICATIONS  Home Medications:  Home Medications:  latanoprost 0.005% ophthalmic solution: 1 drop(s) in each eye once a day (at bedtime) (17 Dec 2024 21:31)  timolol hemihydrate 0.5% ophthalmic solution: 1 drop(s) in each affected eye 2 times a day (17 Dec 2024 21:31)      MEDICATIONS  (STANDING):  chlorhexidine 4% Liquid 1 Application(s) Topical once  lactated ringers. 1000 milliLiter(s) (100 mL/Hr) IV Continuous <Continuous>  latanoprost 0.005% Ophthalmic Solution 1 Drop(s) Both EYES at bedtime  timolol 0.5% Solution 1 Drop(s) Both EYES two times a day    MEDICATIONS  (PRN):  acetaminophen     Tablet .. 1000 milliGRAM(s) Oral every 8 hours PRN Temp greater or equal to 38C (100.4F), Mild Pain (1 - 3)  cyclobenzaprine 5 milliGRAM(s) Oral three times a day PRN Muscle Spasm  oxyCODONE    IR 10 milliGRAM(s) Oral every 4 hours PRN Severe Pain (7 - 10)  oxyCODONE    IR 5 milliGRAM(s) Oral every 4 hours PRN Moderate Pain (4 - 6)  traMADol 50 milliGRAM(s) Oral every 4 hours PRN Mild Pain (1 - 3)    ALLERGIES  No Known Drug Allergies  wasp (Anaphylaxis)      FAMILY Hx  FAMILY HISTORY:  Negative in primary relatives      SOCIAL Hx  Social History: , lives with family, working FT, no EtOH or tob    ROS  Negative unless otherwise specified in HPI.    PE:    ICU Vital Signs Last 24 Hrs  T(C): 36.8 (20 Dec 2024 06:00), Max: 36.8 (20 Dec 2024 06:00)  T(F): 98.2 (20 Dec 2024 06:00), Max: 98.2 (20 Dec 2024 06:00)  HR: 76 (20 Dec 2024 08:00) (61 - 82)  BP: 138/79 (20 Dec 2024 08:00) (129/78 - 148/89)  BP(mean): 98 (20 Dec 2024 08:00) (92 - 107)  ABP: 144/73 (20 Dec 2024 08:00) (119/85 - 156/79)  ABP(mean): 98 (20 Dec 2024 08:00) (92 - 102)  RR: 7 (20 Dec 2024 08:00) (7 - 21)  SpO2: 99% (20 Dec 2024 08:00) (95% - 100%)    O2 Parameters below as of 20 Dec 2024 08:00  Patient On (Oxygen Delivery Method): room air    Patient laying in bed with c/o incisional pain  Pain worsens with change in position  Spine:  Dressing D&I  R OMER with 112 cc post-op/50 cc last 12 hrs  L OMER with 130 cc post-op/60 cc last 12 hrs  No motor deficits LEs      LABS                        10.6   7.77  )-----------( 178      ( 20 Dec 2024 06:30 )             29.6     12-20    136  |  105  |  11  ----------------------------<  111[H]  4.1   |  27  |  0.79    Ca    8.4[L]      20 Dec 2024 06:30       IMAGING  MRI T & LS spine  Comminuted burst fracture of the L1 vertebral body with loss   of 60% of vertebral body height and minimal bony retropulsion.Suggestion   of tiny posterior epidural hematoma extending from the midthoracic spine   inferiorly to the L1 level.  No edema is seen within the posterior soft   tissues or ligaments. Mild degenerative disc disease throughout with mild   loss of disc height and signal within the nucleus pulposus. Mild bulges   noted at L2-3 through L4-5 which flatten the ventral thecal sac and   narrow the BILATERAL neural foramina. Tiny LEFT paracentral disc   herniation at L4-5 compresses the ventral thecal sac. Tiny RIGHT   subarticular disc herniation with annular tear at L5-S1.    CT C/A/P  IMPRESSION:  Comminuted burst fracture of the L1 vertebra with mild retropulsion of   the posterior table into the spinal canal, causing mild canal stenosis.   No evidence of malalignment. No other acute abnormality detected.

## 2024-12-20 NOTE — PHYSICAL THERAPY INITIAL EVALUATION ADULT - MODALITIES TREATMENT COMMENTS
pt left in bed supine post Eval; bed alarm on; all above lines/monitors intact; spouse in room; pt instructed not to get up alone; call nursing for assist; saima fair; pain 3-4/10; RN Miguel present / aware

## 2024-12-20 NOTE — PHYSICAL THERAPY INITIAL EVALUATION ADULT - GENERAL OBSERVATIONS, REHAB EVAL
flowtrons; OMER drains x 2; HM; BP cuff; puls oxym; pt rec'd in bed supine; HR 78; /83; O2 Sat 96%; RR 15; pain 3/10; RN Rosemay present / aware; spouse in room flowtrons; OMER drains x 2; HM; BP cuff; pulse oxym; pt rec'd in bed supine; HR 78; /83; O2 Sat 96%; RR 15; pain 3/10; RN Rosemay present / aware; spouse in room

## 2024-12-20 NOTE — PROGRESS NOTE ADULT - ASSESSMENT
ASSESSMENT & PLAN  54yMale w/ L1 burst fx s/p fall off ladder    POD#1    -admitted to Ortho  -D/C PCA-cont Ofirmex and Oxycodone   -OOB/PT  -D/C young  -medical management  -monitor JPs  -monitor labs  -pain control  -incentive spirometry ASSESSMENT & PLAN  54yMale w/ L1 burst fx s/p fall off ladder-S/P posterior stabilization 12/19/24    POD#1    -admitted to Ortho  -D/C PCA-cont Ofirmex and Oxycodone   -OOB/PT  -D/C young  -medical management  -monitor JPs  -monitor labs  -pain control  -incentive spirometry

## 2024-12-20 NOTE — PHYSICAL THERAPY INITIAL EVALUATION ADULT - GAIT DISTANCE, PT EVAL
3 feet; distance limited due to pt c/o dizziness / nausea; pt noted to be diaphoretic / pale; symptoms relieved upon return to bed supine; RN Miguel present / aware

## 2024-12-20 NOTE — PHYSICAL THERAPY INITIAL EVALUATION ADULT - LIGHT TOUCH SENSATION, RUE, REHAB EVAL
How Severe Is Your Rash?: moderate Response To Previous Treatment?: topical treatments are ineffective Is This A New Presentation, Or A Follow-Up?: Follow Up Allergic Contact Dermatitis within normal limits

## 2024-12-21 RX ORDER — ACETAMINOPHEN 80 MG/.8ML
1000 SOLUTION/ DROPS ORAL ONCE
Refills: 0 | Status: DISCONTINUED | OUTPATIENT
Start: 2024-12-21 | End: 2024-12-21

## 2024-12-21 RX ORDER — OXYCODONE HCL 15 MG
10 TABLET ORAL
Refills: 0 | Status: DISCONTINUED | OUTPATIENT
Start: 2024-12-21 | End: 2024-12-23

## 2024-12-21 RX ORDER — ACETAMINOPHEN 80 MG/.8ML
1000 SOLUTION/ DROPS ORAL ONCE
Refills: 0 | Status: COMPLETED | OUTPATIENT
Start: 2024-12-21 | End: 2024-12-21

## 2024-12-21 RX ORDER — HYDROMORPHONE HCL 4 MG
2 TABLET ORAL
Refills: 0 | Status: DISCONTINUED | OUTPATIENT
Start: 2024-12-21 | End: 2024-12-23

## 2024-12-21 RX ORDER — OXYCODONE HCL 15 MG
5 TABLET ORAL
Refills: 0 | Status: DISCONTINUED | OUTPATIENT
Start: 2024-12-21 | End: 2024-12-23

## 2024-12-21 RX ADMIN — LATANOPROST 1 DROP(S): 50 SOLUTION OPHTHALMIC at 21:50

## 2024-12-21 RX ADMIN — Medication 1 DROP(S): at 21:50

## 2024-12-21 RX ADMIN — Medication 200 MILLIGRAM(S): at 11:00

## 2024-12-21 RX ADMIN — Medication 1 DROP(S): at 10:47

## 2024-12-21 RX ADMIN — Medication 1 TABLET(S): at 10:49

## 2024-12-21 RX ADMIN — ACETAMINOPHEN 1000 MILLIGRAM(S): 80 SOLUTION/ DROPS ORAL at 00:15

## 2024-12-21 RX ADMIN — Medication 10 MILLIGRAM(S): at 21:50

## 2024-12-21 RX ADMIN — Medication 10 MILLIGRAM(S): at 15:36

## 2024-12-21 RX ADMIN — Medication 200 MILLIGRAM(S): at 10:48

## 2024-12-21 RX ADMIN — Medication 10 MILLIGRAM(S): at 04:32

## 2024-12-21 RX ADMIN — ACETAMINOPHEN 400 MILLIGRAM(S): 80 SOLUTION/ DROPS ORAL at 00:00

## 2024-12-21 NOTE — PROGRESS NOTE ADULT - PROBLEM SELECTOR PROBLEM 2
Closed three column fracture of lumbar vertebra

## 2024-12-21 NOTE — PROGRESS NOTE ADULT - PROBLEM SELECTOR PLAN 1
options for treatment discussed again w pt and wife  alternative to surgery is prolonged bedrest 2-3 months vs surgery  I spoke with Dr Nicko Ricci spine surgeon last night regarding second opinion  I sent him the images  he agreed with planned surgery    discussed w pt and wife   all questions answered  They spoke w my office regarding insurance  they are aware that I am out of network and that this is an emergency  cost of Emergency surgery will have to be dealt with with the ins carrier    all questions answered  The OR will give me time tomorrow
stable post op  ok to transfer to 2N  all questions answered  doing well
needs admission  needs MRI  needs surgical stabilization  cannot go home  fx is not treatable in a brace  discussed w pt and his wife emergent need for surgical stabilization  may need decompression if mri shows hematoma    pt and wife aware that this is an emergency and should not wait to be dealt with as an outpt due to chance of developing neuro deficit and/or kyphosis w chronic back pain  they are aware that I am not in [facility] ins; but that this is emergent  will need to deal with insurance issues later  they may have a deductible which they will be responsible for  risks of anesthesia infection neurovascular compromise paralysis or failure of the procedure has been discussed w pt and wife  all questions answered    they want to proceed   may want a 2nd opinion for surgery  I said no problem  all questions answered

## 2024-12-21 NOTE — PROGRESS NOTE ADULT - SUBJECTIVE AND OBJECTIVE BOX
POD#2  oob  + flatus  + void  comfortable  afeb vss  nv intact  dressing intact  10.6/29.6  drains   rt 30/95  left 50/115  both left in

## 2024-12-21 NOTE — PROGRESS NOTE ADULT - PROBLEM SELECTOR PROBLEM 1
Crush fracture of lumbar vertebra

## 2024-12-22 RX ORDER — CYCLOBENZAPRINE HCL 10 MG
5 TABLET ORAL THREE TIMES A DAY
Refills: 0 | Status: DISCONTINUED | OUTPATIENT
Start: 2024-12-22 | End: 2024-12-23

## 2024-12-22 RX ORDER — ACETAMINOPHEN 80 MG/.8ML
650 SOLUTION/ DROPS ORAL EVERY 6 HOURS
Refills: 0 | Status: DISCONTINUED | OUTPATIENT
Start: 2024-12-22 | End: 2024-12-23

## 2024-12-22 RX ADMIN — Medication 200 MILLIGRAM(S): at 09:06

## 2024-12-22 RX ADMIN — Medication 10 MILLIGRAM(S): at 05:19

## 2024-12-22 RX ADMIN — Medication 1 DROP(S): at 21:07

## 2024-12-22 RX ADMIN — Medication 1 DROP(S): at 09:06

## 2024-12-22 RX ADMIN — Medication 1 TABLET(S): at 09:06

## 2024-12-22 RX ADMIN — LATANOPROST 1 DROP(S): 50 SOLUTION OPHTHALMIC at 21:08

## 2024-12-22 NOTE — PROGRESS NOTE ADULT - ASSESSMENT
ASSESSMENT & PLAN  54yMale w/ L1 burst fx s/p fall off ladder-S/P posterior stabilization 12/19/24    POD#3    -admitted to Ortho  -Cont Ofirmex and Oxycodone   -OOB/PT  -medical management  -monitor JPs-probable removal tomorrow  -monitor labs  -pain control  -incentive spirometry  -D/C Planning in AM

## 2024-12-22 NOTE — PROGRESS NOTE ADULT - SUBJECTIVE AND OBJECTIVE BOX
HPI:  HPI  54yMale c/o low back pain s/p mechanical fall off 6ft ladder. Denies focal weakness, numbness/tingling, or radicular sxs. Denies fevers/chills, acute changes in bowel/bladder function, or saddle anesthesia. Denies HS/LOC or any other ortho injuries at this time. Community ambulator w/o assistive devices at baseline. Denies AC use. Denies hx of malignancy.    Studies revealed comminuted burst fracture L1 with good overall alignment    12/18/24 Patient with persistent thoracolumbar back pain with any activity-denies radicular pain or weakness, change in bowel/bladder function  12/19/24 S/P T11-L3 fusion, SSI, LBG, allograft  12/20/24 Patient c/o marked incisional pain  12/22/24 Patient comfortable    PAST MEDICAL & SURGICAL Hx  PAST MEDICAL & SURGICAL HISTORY:  Glaucoma    MEDICATIONS  Home Medications:  Home Medications:  latanoprost 0.005% ophthalmic solution: 1 drop(s) in each eye once a day (at bedtime) (17 Dec 2024 21:31)  timolol hemihydrate 0.5% ophthalmic solution: 1 drop(s) in each affected eye 2 times a day (17 Dec 2024 21:31)      MEDICATIONS  (STANDING):  chlorhexidine 4% Liquid 1 Application(s) Topical once  lactated ringers. 1000 milliLiter(s) (100 mL/Hr) IV Continuous <Continuous>  latanoprost 0.005% Ophthalmic Solution 1 Drop(s) Both EYES at bedtime  timolol 0.5% Solution 1 Drop(s) Both EYES two times a day    MEDICATIONS  (PRN):  acetaminophen     Tablet .. 1000 milliGRAM(s) Oral every 8 hours PRN Temp greater or equal to 38C (100.4F), Mild Pain (1 - 3)  cyclobenzaprine 5 milliGRAM(s) Oral three times a day PRN Muscle Spasm  oxyCODONE    IR 10 milliGRAM(s) Oral every 4 hours PRN Severe Pain (7 - 10)  oxyCODONE    IR 5 milliGRAM(s) Oral every 4 hours PRN Moderate Pain (4 - 6)  traMADol 50 milliGRAM(s) Oral every 4 hours PRN Mild Pain (1 - 3)    ALLERGIES  No Known Drug Allergies  wasp (Anaphylaxis)      FAMILY Hx  FAMILY HISTORY:  Negative in primary relatives      SOCIAL Hx  Social History: , lives with family, working FT, no EtOH or tob    ROS  Negative unless otherwise specified in HPI.    PE:    Vital Signs Last 24 Hrs  T(C): 36.7 (22 Dec 2024 08:00), Max: 37.1 (21 Dec 2024 16:10)  T(F): 98.1 (22 Dec 2024 08:00), Max: 98.7 (21 Dec 2024 16:10)  HR: 86 (22 Dec 2024 08:00) (68 - 97)  BP: 140/80 (22 Dec 2024 08:00) (122/86 - 142/83)  BP(mean): 101 (21 Dec 2024 16:00) (96 - 101)  RR: 16 (22 Dec 2024 08:00) (11 - 18)  SpO2: 96% (22 Dec 2024 08:00) (96% - 99%)    Parameters below as of 22 Dec 2024 08:00  Patient On (Oxygen Delivery Method): room air    Patient sitting on side of bed-comfortable  Spine:  Dressing D&I  R OMER with 50 cc last 24 hrs/20 cc last 12 hrs  L OMER with 55 cc last 24 hrs/25 cc last 12 hrs  No motor deficits LEs      LABS             Complete Blood Count + Automated Diff (12.20.24 @ 06:30)    WBC Count: 7.77 K/uL   RBC Count: 3.44 M/uL   Hemoglobin: 10.6 g/dL   Hematocrit: 29.6 %   Mean Cell Volume: 86.0 fl   Mean Cell Hemoglobin: 30.8 pg   Mean Cell Hemoglobin Conc: 35.8 g/dL   Red Cell Distrib Width: 11.7 %   Platelet Count - Automated: 178 K/uL   Auto Neutrophil #: 6.24 K/uL   Auto Lymphocyte #: 0.94 K/uL   Auto Monocyte #: 0.51 K/uL   Auto Eosinophil #: 0.03 K/uL   Auto Basophil #: 0.02 K/uL   Auto Neutrophil %: 80.2: Differential percentages must be correlated with absolute numbers for  clinical significance. %   Auto Lymphocyte %: 12.1 %   Auto Monocyte %: 6.6 %   Auto Eosinophil %: 0.4 %   Auto Basophil %: 0.3 %   Auto Immature Granulocyte %: 0.4: (Includes meta, myelo and promyelocytes). Mild elevations in immature  granulocytes may be seen with many inflammatory processes and pregnancy;  clinical correlation suggested. %           IMAGING  MRI T & LS spine  Comminuted burst fracture of the L1 vertebral body with loss   of 60% of vertebral body height and minimal bony retropulsion.Suggestion   of tiny posterior epidural hematoma extending from the midthoracic spine   inferiorly to the L1 level.  No edema is seen within the posterior soft   tissues or ligaments. Mild degenerative disc disease throughout with mild   loss of disc height and signal within the nucleus pulposus. Mild bulges   noted at L2-3 through L4-5 which flatten the ventral thecal sac and   narrow the BILATERAL neural foramina. Tiny LEFT paracentral disc   herniation at L4-5 compresses the ventral thecal sac. Tiny RIGHT   subarticular disc herniation with annular tear at L5-S1.    CT C/A/P  IMPRESSION:  Comminuted burst fracture of the L1 vertebra with mild retropulsion of   the posterior table into the spinal canal, causing mild canal stenosis.   No evidence of malalignment. No other acute abnormality detected.

## 2024-12-23 ENCOUNTER — TRANSCRIPTION ENCOUNTER (OUTPATIENT)
Age: 54
End: 2024-12-23

## 2024-12-23 VITALS
OXYGEN SATURATION: 100 % | HEART RATE: 73 BPM | DIASTOLIC BLOOD PRESSURE: 84 MMHG | RESPIRATION RATE: 17 BRPM | TEMPERATURE: 97 F | SYSTOLIC BLOOD PRESSURE: 136 MMHG

## 2024-12-23 RX ORDER — ACETAMINOPHEN 80 MG/.8ML
2 SOLUTION/ DROPS ORAL
Qty: 0 | Refills: 0 | DISCHARGE
Start: 2024-12-23

## 2024-12-23 RX ORDER — CYCLOBENZAPRINE HCL 10 MG
1 TABLET ORAL
Qty: 30 | Refills: 0
Start: 2024-12-23 | End: 2025-01-01

## 2024-12-23 RX ORDER — CELECOXIB 200 MG
1 CAPSULE ORAL
Qty: 30 | Refills: 0
Start: 2024-12-23

## 2024-12-23 RX ADMIN — Medication 1 TABLET(S): at 09:43

## 2024-12-23 RX ADMIN — Medication 200 MILLIGRAM(S): at 09:43

## 2024-12-23 RX ADMIN — ACETAMINOPHEN 650 MILLIGRAM(S): 80 SOLUTION/ DROPS ORAL at 06:22

## 2024-12-23 RX ADMIN — ACETAMINOPHEN 650 MILLIGRAM(S): 80 SOLUTION/ DROPS ORAL at 06:52

## 2024-12-23 RX ADMIN — Medication 1 DROP(S): at 09:42

## 2024-12-23 NOTE — DISCHARGE NOTE NURSING/CASE MANAGEMENT/SOCIAL WORK - FINANCIAL ASSISTANCE
[facility] provides services at a reduced cost to those who are determined to be eligible through [facility]’s financial assistance program. Information regarding [facility]’s financial assistance program can be found by going to [email or web address] or by calling [phone].

## 2024-12-23 NOTE — DISCHARGE NOTE PROVIDER - CARE PROVIDERS DIRECT ADDRESSES
[email or web address]

## 2024-12-23 NOTE — DISCHARGE NOTE PROVIDER - HOSPITAL COURSE
54yMale c/o low back pain s/p mechanical fall off 6ft ladder. Denies focal weakness, numbness/tingling, or radicular sxs. Denies fevers/chills, acute changes in bowel/bladder function, or saddle anesthesia. Denies HS/LOC or any other ortho injuries at this time. Community ambulator w/o assistive devices at baseline. Denies AC use. Denies hx of malignancy.    Studies revealed comminuted burst fracture L1 with good overall alignment    12/18/24 Patient with persistent thoracolumbar back pain with any activity-denies radicular pain or weakness, change in bowel/bladder function  12/19/24 S/P T11-L3 fusion, SSI, LBG, allograft  12/20/24 Patient c/o marked incisional pain, (+) JPs, PCA d/c'd  12/21/24 Patient doing well. (+) OMER drainage  12/22/24 Patient comfortable, OMER with decreased drainage  12/23/24 Patient commfortable, OMER with minimal drainage-removed, incision clean and dry, neuro intact, stable for D/C home

## 2024-12-23 NOTE — DISCHARGE NOTE PROVIDER - NSDCCPCAREPLAN_GEN_ALL_CORE_FT
PRINCIPAL DISCHARGE DIAGNOSIS  Diagnosis: Closed burst fracture of lumbar vertebra  Assessment and Plan of Treatment:

## 2024-12-23 NOTE — DISCHARGE NOTE PROVIDER - CARE PROVIDER_API CALL
Tan Quinn  Orthopaedic Surgery  [address] [place] [id number]  Phone: [phone]  Fax: [phone]  Follow Up Time:

## 2024-12-23 NOTE — DISCHARGE NOTE PROVIDER - NSDCACTIVITY_GEN_ALL_CORE
Sex allowed/Showering allowed/Stairs allowed/Walking - Indoors allowed/No heavy lifting/straining/Walking - Outdoors allowed/Activity as tolerated

## 2024-12-23 NOTE — DISCHARGE NOTE NURSING/CASE MANAGEMENT/SOCIAL WORK - PATIENT PORTAL LINK FT
You can access the FollowMyHealth Patient Portal offered by [facility] by registering at the following website: [email or web address]. By joining [facility]’s FollowMyHealth portal, you will also be able to view your health information using other applications (apps) compatible with our system.

## 2024-12-23 NOTE — DISCHARGE NOTE NURSING/CASE MANAGEMENT/SOCIAL WORK - NSDCPEFALRISK_GEN_ALL_CORE
For information on Fall & Injury Prevention, visit: [email or web address] OR  [email or web address] OR  https://www.cdc.gov/steadi/patient.html

## 2024-12-23 NOTE — DISCHARGE NOTE PROVIDER - NSDCMRMEDTOKEN_GEN_ALL_CORE_FT
acetaminophen 325 mg oral tablet: 2 tab(s) orally every 6 hours  celecoxib 200 mg oral capsule: 1 cap(s) orally once a day MDD: 1  cyclobenzaprine 5 mg oral tablet: 1 tab(s) orally 3 times a day as needed for Muscle Spasm MDD: 3  latanoprost 0.005% ophthalmic solution: 1 drop(s) in each eye once a day (at bedtime)  timolol hemihydrate 0.5% ophthalmic solution: 1 drop(s) in each affected eye 2 times a day
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
## 2024-12-27 DIAGNOSIS — S32.012A UNSTABLE BURST FRACTURE OF FIRST LUMBAR VERTEBRA, INITIAL ENCOUNTER FOR CLOSED FRACTURE: ICD-10-CM

## 2024-12-27 DIAGNOSIS — Z91.038 OTHER INSECT ALLERGY STATUS: ICD-10-CM

## 2025-02-25 ENCOUNTER — NON-APPOINTMENT (OUTPATIENT)
Age: 55
End: 2025-02-25

## 2025-05-20 ENCOUNTER — NON-APPOINTMENT (OUTPATIENT)
Age: 55
End: 2025-05-20

## 2025-05-20 ENCOUNTER — APPOINTMENT (OUTPATIENT)
Dept: DERMATOLOGY | Facility: CLINIC | Age: 55
End: 2025-05-20

## 2025-05-20 PROCEDURE — 99203 OFFICE O/P NEW LOW 30 MIN: CPT

## 2025-06-23 ENCOUNTER — OFFICE (OUTPATIENT)
Dept: URBAN - METROPOLITAN AREA CLINIC 102 | Facility: CLINIC | Age: 55
Setting detail: OPHTHALMOLOGY
End: 2025-06-23
Payer: COMMERCIAL

## 2025-06-23 DIAGNOSIS — D31.30: ICD-10-CM

## 2025-06-23 DIAGNOSIS — H40.023: ICD-10-CM

## 2025-06-23 PROCEDURE — 92083 EXTENDED VISUAL FIELD XM: CPT | Performed by: OPHTHALMOLOGY

## 2025-06-23 PROCEDURE — 92250 FUNDUS PHOTOGRAPHY W/I&R: CPT | Performed by: OPHTHALMOLOGY

## 2025-06-23 PROCEDURE — 92014 COMPRE OPH EXAM EST PT 1/>: CPT | Performed by: OPHTHALMOLOGY

## 2025-06-23 ASSESSMENT — REFRACTION_AUTOREFRACTION
OS_AXIS: 093
OD_CYLINDER: -1.00
OS_SPHERE: +0.50
OD_SPHERE: +0.25
OS_CYLINDER: -1.25
OD_AXIS: 086

## 2025-06-23 ASSESSMENT — TONOMETRY
OD_IOP_MMHG: 20
OS_IOP_MMHG: 18
OD_IOP_MMHG: 16
OS_IOP_MMHG: 13

## 2025-06-23 ASSESSMENT — CONFRONTATIONAL VISUAL FIELD TEST (CVF)
OS_FINDINGS: FULL
OD_FINDINGS: FULL

## 2025-06-23 ASSESSMENT — KERATOMETRY
OS_K2POWER_DIOPTERS: 44.75
OS_AXISANGLE_DEGREES: 016
OS_K1POWER_DIOPTERS: 44.00
METHOD_AUTO_MANUAL: AUTO
OD_AXISANGLE_DEGREES: 157
OD_K1POWER_DIOPTERS: 44.00
OD_K2POWER_DIOPTERS: 45.00

## 2025-06-23 ASSESSMENT — VISUAL ACUITY
OS_BCVA: 20/25
OD_BCVA: 20/20-1

## 2025-06-23 ASSESSMENT — PACHYMETRY
OS_CT_UM: 559
OS_CT_CORRECTION: -1
OD_CT_UM: 574
OD_CT_CORRECTION: -2

## 2025-09-17 ENCOUNTER — APPOINTMENT (OUTPATIENT)
Dept: DERMATOLOGY | Facility: CLINIC | Age: 55
End: 2025-09-17
Payer: COMMERCIAL

## 2025-09-17 DIAGNOSIS — R20.8 OTHER DISTURBANCES OF SKIN SENSATION: ICD-10-CM

## 2025-09-17 DIAGNOSIS — B07.9 VIRAL WART, UNSPECIFIED: ICD-10-CM

## 2025-09-17 DIAGNOSIS — D22.61 MELANOCYTIC NEVI OF RIGHT UPPER LIMB, INCLUDING SHOULDER: ICD-10-CM

## 2025-09-17 DIAGNOSIS — L81.4 OTHER MELANIN HYPERPIGMENTATION: ICD-10-CM

## 2025-09-17 PROCEDURE — 17110 DESTRUCTION B9 LES UP TO 14: CPT

## 2025-09-17 PROCEDURE — 99212 OFFICE O/P EST SF 10 MIN: CPT | Mod: 25
